# Patient Record
Sex: FEMALE | Race: WHITE | NOT HISPANIC OR LATINO | Employment: OTHER | URBAN - METROPOLITAN AREA
[De-identification: names, ages, dates, MRNs, and addresses within clinical notes are randomized per-mention and may not be internally consistent; named-entity substitution may affect disease eponyms.]

---

## 2018-05-08 ENCOUNTER — OFFICE VISIT (OUTPATIENT)
Dept: ENDOCRINOLOGY | Facility: CLINIC | Age: 73
End: 2018-05-08
Attending: INTERNAL MEDICINE
Payer: MEDICARE

## 2018-05-08 VITALS
BODY MASS INDEX: 27.29 KG/M2 | WEIGHT: 154 LBS | HEART RATE: 56 BPM | DIASTOLIC BLOOD PRESSURE: 60 MMHG | SYSTOLIC BLOOD PRESSURE: 130 MMHG | HEIGHT: 63 IN

## 2018-05-08 DIAGNOSIS — E89.0 POST-SURGICAL HYPOTHYROIDISM: ICD-10-CM

## 2018-05-08 DIAGNOSIS — E55.9 VITAMIN D DEFICIENCY: ICD-10-CM

## 2018-05-08 DIAGNOSIS — M85.80 OSTEOPENIA, UNSPECIFIED LOCATION: Primary | ICD-10-CM

## 2018-05-08 PROCEDURE — 99214 OFFICE O/P EST MOD 30 MIN: CPT | Performed by: INTERNAL MEDICINE

## 2018-05-08 RX ORDER — ESOMEPRAZOLE MAGNESIUM 40 MG/1
40 CAPSULE, DELAYED RELEASE ORAL
COMMUNITY
Start: 2016-11-02 | End: 2018-07-19

## 2018-05-08 RX ORDER — VITAMIN E 268 MG
CAPSULE ORAL
COMMUNITY
Start: 2016-11-02

## 2018-05-08 RX ORDER — ASPIRIN 81 MG/1
81 TABLET ORAL
COMMUNITY
Start: 2016-11-02 | End: 2021-05-11

## 2018-05-08 RX ORDER — CHOLECALCIFEROL (VITAMIN D3) 50 MCG
TABLET ORAL
COMMUNITY
Start: 2016-11-02

## 2018-05-08 RX ORDER — FEXOFENADINE HCL AND PSEUDOEPHEDRINE HCL 180; 240 MG/1; MG/1
TABLET, EXTENDED RELEASE ORAL
COMMUNITY
Start: 2016-11-02

## 2018-05-08 RX ORDER — ROSUVASTATIN CALCIUM 20 MG/1
10 TABLET, COATED ORAL
COMMUNITY
Start: 2016-11-02

## 2018-05-08 RX ORDER — LEVOTHYROXINE SODIUM 88 UG/1
TABLET ORAL
COMMUNITY
Start: 2017-05-09 | End: 2018-07-09 | Stop reason: SDUPTHER

## 2018-05-08 RX ORDER — LANOLIN ALCOHOL/MO/W.PET/CERES
500 CREAM (GRAM) TOPICAL
COMMUNITY
Start: 2016-11-02
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
## 2018-05-08 NOTE — PATIENT INSTRUCTIONS
1.  get your blood test done    2. Continue vitamin D3, 2000 IU daily    3. Add centrum women 50 plus at dinner or later    4. When you need next prescription will send generic synthroid and recheck your levels to make sure it is ok    5. Continue walking    6. Add yoga/pilates etc...    7. Follow up in one year, get your bone density with dr. Reese, I will be to see it when you come back and see me.
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
## 2018-06-08 ENCOUNTER — APPOINTMENT (OUTPATIENT)
Dept: LAB | Facility: HOSPITAL | Age: 73
End: 2018-06-08
Attending: INTERNAL MEDICINE
Payer: MEDICARE

## 2018-06-08 DIAGNOSIS — M85.80 OSTEOPENIA, UNSPECIFIED LOCATION: ICD-10-CM

## 2018-06-08 DIAGNOSIS — E89.0 POST-SURGICAL HYPOTHYROIDISM: ICD-10-CM

## 2018-06-08 DIAGNOSIS — E55.9 VITAMIN D DEFICIENCY: ICD-10-CM

## 2018-06-08 LAB
25(OH)D3 SERPL-MCNC: 34 NG/ML (ref 30–100)
ALBUMIN SERPL-MCNC: 4.2 G/DL (ref 3.4–5)
ALP SERPL-CCNC: 82 IU/L (ref 35–126)
ALT SERPL-CCNC: 24 IU/L (ref 11–54)
ANION GAP SERPL CALC-SCNC: 10 MEQ/L (ref 3–15)
AST SERPL-CCNC: 25 IU/L (ref 15–41)
BILIRUB SERPL-MCNC: 0.6 MG/DL (ref 0.3–1.2)
BUN SERPL-MCNC: 15 MG/DL (ref 8–20)
CALCIUM SERPL-MCNC: 9.5 MG/DL (ref 8.9–10.3)
CALCIUM SERPL-MCNC: 9.5 MG/DL (ref 8.9–10.3)
CHLORIDE SERPL-SCNC: 102 MMOL/L (ref 98–109)
CO2 SERPL-SCNC: 27 MMOL/L (ref 22–32)
CREAT SERPL-MCNC: 0.7 MG/DL (ref 0.6–1.1)
GFR SERPL CREATININE-BSD FRML MDRD: >60 ML/MIN/1.73M*2
GLUCOSE SERPL-MCNC: 138 MG/DL (ref 70–99)
PHOSPHATE SERPL-MCNC: 3.9 MG/DL (ref 2.4–4.7)
POTASSIUM SERPL-SCNC: 4 MMOL/L (ref 3.6–5.1)
PROT SERPL-MCNC: 6.1 G/DL (ref 6–8.2)
PTH-INTACT SERPL-MCNC: 55.1 PG/ML (ref 12–88)
SODIUM SERPL-SCNC: 139 MMOL/L (ref 136–144)
T4 FREE SERPL-MCNC: 0.78 NG/DL (ref 0.58–1.64)
TSH SERPL DL<=0.05 MIU/L-ACNC: 0.9 MIU/ML (ref 0.34–5.6)

## 2018-06-08 PROCEDURE — 84443 ASSAY THYROID STIM HORMONE: CPT

## 2018-06-08 PROCEDURE — 84100 ASSAY OF PHOSPHORUS: CPT

## 2018-06-08 PROCEDURE — 84439 ASSAY OF FREE THYROXINE: CPT

## 2018-06-08 PROCEDURE — 82306 VITAMIN D 25 HYDROXY: CPT

## 2018-06-08 PROCEDURE — 83970 ASSAY OF PARATHORMONE: CPT

## 2018-06-08 PROCEDURE — 36415 COLL VENOUS BLD VENIPUNCTURE: CPT

## 2018-06-08 PROCEDURE — 80053 COMPREHEN METABOLIC PANEL: CPT

## 2018-07-09 RX ORDER — LEVOTHYROXINE SODIUM 88 UG/1
TABLET ORAL
Qty: 90 TABLET | Refills: 0 | Status: SHIPPED | OUTPATIENT
Start: 2018-07-09 | End: 2018-07-11 | Stop reason: SDUPTHER

## 2018-07-11 RX ORDER — LEVOTHYROXINE SODIUM 88 UG/1
TABLET ORAL
Qty: 90 TABLET | Refills: 0 | Status: SHIPPED | OUTPATIENT
Start: 2018-07-11 | End: 2019-01-28 | Stop reason: SDUPTHER

## 2018-07-19 ENCOUNTER — HOSPITAL ENCOUNTER (EMERGENCY)
Facility: HOSPITAL | Age: 73
Discharge: HOME | End: 2018-07-19
Attending: EMERGENCY MEDICINE
Payer: MEDICARE

## 2018-07-19 ENCOUNTER — APPOINTMENT (EMERGENCY)
Dept: RADIOLOGY | Facility: HOSPITAL | Age: 73
End: 2018-07-19
Payer: MEDICARE

## 2018-07-19 VITALS
HEIGHT: 63 IN | DIASTOLIC BLOOD PRESSURE: 63 MMHG | RESPIRATION RATE: 18 BRPM | TEMPERATURE: 97.1 F | SYSTOLIC BLOOD PRESSURE: 137 MMHG | BODY MASS INDEX: 26.58 KG/M2 | HEART RATE: 59 BPM | OXYGEN SATURATION: 95 % | WEIGHT: 150 LBS

## 2018-07-19 DIAGNOSIS — Z86.69 HISTORY OF MIGRAINE: ICD-10-CM

## 2018-07-19 DIAGNOSIS — H53.9 VISUAL DISTURBANCE: ICD-10-CM

## 2018-07-19 DIAGNOSIS — R51.9 NONINTRACTABLE HEADACHE, UNSPECIFIED CHRONICITY PATTERN, UNSPECIFIED HEADACHE TYPE: Primary | ICD-10-CM

## 2018-07-19 PROCEDURE — 63700000 HC SELF-ADMINISTRABLE DRUG: Performed by: NURSE PRACTITIONER

## 2018-07-19 PROCEDURE — 70450 CT HEAD/BRAIN W/O DYE: CPT

## 2018-07-19 PROCEDURE — 99284 EMERGENCY DEPT VISIT MOD MDM: CPT | Mod: 25

## 2018-07-19 RX ORDER — ACETAMINOPHEN 325 MG/1
650 TABLET ORAL ONCE
Status: COMPLETED | OUTPATIENT
Start: 2018-07-19 | End: 2018-07-19

## 2018-07-19 RX ADMIN — ACETAMINOPHEN 650 MG: 325 TABLET, FILM COATED ORAL at 20:08

## 2018-07-19 ASSESSMENT — ENCOUNTER SYMPTOMS
CONSTIPATION: 0
SORE THROAT: 0
FATIGUE: 0
EYE REDNESS: 0
NAUSEA: 0
EYE PAIN: 0
DIZZINESS: 0
MYALGIAS: 1
VOMITING: 0
SPEECH DIFFICULTY: 0
FACIAL ASYMMETRY: 0
COUGH: 0
LIGHT-HEADEDNESS: 0
DIARRHEA: 0
BACK PAIN: 0
NECK PAIN: 0
NUMBNESS: 0
COLOR CHANGE: 0
SHORTNESS OF BREATH: 0
FEVER: 0
ABDOMINAL PAIN: 0
CONFUSION: 0
DIFFICULTY URINATING: 0
HEADACHES: 1
PHOTOPHOBIA: 1
WEAKNESS: 0

## 2018-07-20 NOTE — ED ATTESTATION NOTE
7/19/201810:21 PM  I have personally seen and examined the patient.  I reviewed and agree with the PA/NP/Resident's assessment and plan of care.         Dwayne Foster, DO  07/19/18 2222

## 2018-07-20 NOTE — DISCHARGE INSTRUCTIONS
As discussed please call your primary care doctor and ophthalmologist today to inform them of your ER visit and arrange a follow-up appointment within the next 2-3 days.  If you do not have one we have provided you with one. You do not have to see this provider in particular, just call the office and ask for the earliest available appointment.  Please return immediately for any worsening, new or concerning symptoms such as new/worsening pain, fevers, vomiting, worsening in vision, numbness, weakness, etc    Please take acetaminophen (Tylenol) as per package instructions.  Please do not exceed 3 g in 24 hours    Please take ibuprofen (i.e. Motrin or Advil) as per package instructions with food do not exceed 3200mg in 24 hours    No driving if you are having visual changes

## 2019-01-28 RX ORDER — LEVOTHYROXINE SODIUM 88 UG/1
TABLET ORAL
Qty: 90 TABLET | Refills: 0 | Status: SHIPPED | OUTPATIENT
Start: 2019-01-28 | End: 2019-04-25 | Stop reason: SDUPTHER

## 2019-04-09 PROCEDURE — 88305 TISSUE EXAM BY PATHOLOGIST: CPT | Performed by: DERMATOLOGY

## 2019-04-10 ENCOUNTER — LAB REQUISITION (OUTPATIENT)
Dept: LAB | Facility: HOSPITAL | Age: 74
End: 2019-04-10
Payer: MEDICARE

## 2019-04-10 DIAGNOSIS — D23.5 OTHER BENIGN NEOPLASM OF SKIN OF TRUNK: ICD-10-CM

## 2019-04-11 LAB
CASE RPRT: NORMAL
CLINICAL INFO: NORMAL
PATH REPORT.FINAL DX SPEC: NORMAL
PATH REPORT.GROSS SPEC: NORMAL
PATH REPORT.MICROSCOPIC SPEC OTHER STN: NORMAL

## 2019-04-25 RX ORDER — LEVOTHYROXINE SODIUM 88 UG/1
TABLET ORAL
Qty: 90 TABLET | Refills: 0 | Status: SHIPPED | OUTPATIENT
Start: 2019-04-25 | End: 2019-05-07 | Stop reason: SDUPTHER

## 2019-05-07 ENCOUNTER — OFFICE VISIT (OUTPATIENT)
Dept: ENDOCRINOLOGY | Facility: CLINIC | Age: 74
End: 2019-05-07
Payer: MEDICARE

## 2019-05-07 VITALS
BODY MASS INDEX: 26.86 KG/M2 | WEIGHT: 151.6 LBS | HEART RATE: 61 BPM | DIASTOLIC BLOOD PRESSURE: 64 MMHG | HEIGHT: 63 IN | SYSTOLIC BLOOD PRESSURE: 120 MMHG

## 2019-05-07 DIAGNOSIS — E89.0 POST-SURGICAL HYPOTHYROIDISM: ICD-10-CM

## 2019-05-07 DIAGNOSIS — E55.9 VITAMIN D DEFICIENCY: ICD-10-CM

## 2019-05-07 DIAGNOSIS — M85.80 OSTEOPENIA, UNSPECIFIED LOCATION: Primary | ICD-10-CM

## 2019-05-07 PROCEDURE — 99214 OFFICE O/P EST MOD 30 MIN: CPT | Performed by: INTERNAL MEDICINE

## 2019-05-07 RX ORDER — LEVOTHYROXINE SODIUM 88 UG/1
88 TABLET ORAL
Qty: 90 TABLET | Refills: 3 | Status: SHIPPED | OUTPATIENT
Start: 2019-05-07 | End: 2020-01-13 | Stop reason: SDUPTHER

## 2019-05-07 NOTE — PATIENT INSTRUCTIONS
1. Synthroid 88 mcg daily    2. Thyroid and vitamin D level when you get blood work done    3. Bone density is stable    4. Follow up with me in one year

## 2019-05-07 NOTE — LETTER
May 7, 2019     Tiera Egan MD  [address]  [place] [address]    Patient: Agnieszka Burton   YOB: 1945   Date of Visit: 5/7/2019       Dear Dr. Egan:    Thank you for referring Agnieszka Burton to me for evaluation. Below are my notes for this consultation.    If you have questions, please do not hesitate to call me. I look forward to following your patient along with you.         Sincerely,        Aicha Cota MD        CC: No Recipients  Aicha Cota MD  5/7/2019 12:22 PM  Signed       ENDOCRINOLOGY NOTE       Subjective    HPI:  Agnieszka Burton is a 73 y.o. female who presents for follow up for thyroid disease and osteopenia    Last visit: 5/9/18, she continues on Synthroid 88 mcg daily.     In 2017,She took Atelvia for about a month, but she developed severe generalized joint pain, including in her knees, shoulders, elbows, and wrists, and those symptoms improved about a week after she stopped the Atelvia.      She also started taking Citracal calcium supplement, but developed significant abdominal cramps.  She also has lactose intolerance, so it is hard for her to get enough calcium in her diet.    She is eating green leafy vegetables, she uses lactaid in her coffee, 4 to 8 oz per day. She added the centrum for women    She walks the dog, she is gardening. Will be swimming soon.         Medications:  Synthroid 88 mcg daily, vitamin D3, 2000 units daily, Nexium 40 mg daily, Crestor 10 mg daily, aspirin 81 mg daily, Allegra, vitamin C and vitamin E, centrum for women 50 +    Social History:  She stopped smoking completely in February of 2017.  She is .  She has one son and one daughter.  She stopped working January of 2018. 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         Medications:    Current Outpatient Prescriptions:   •  ascorbic acid, vitamin C, (VITAMIN C) 500 mg CR  capsule, 500 mg., Disp: , Rfl:   •  aspirin (ASPIR-81) 81 mg enteric coated tablet, 81 mg., Disp: , Rfl:   •  cholecalciferol, vitamin D3, (VITAMIN D3) 2,000 unit tablet, take 1 by oral route  every day, Disp: , Rfl:   •  fexofenadine-pseudoephedrine (ALLEGRA-D 24 HOUR) 180-240 mg per 24 hr tablet, take 1 tablet by oral route  every day on an empty stomach with a glass of water, Disp: , Rfl:   •  rosuvastatin (CRESTOR) 10 mg tablet, 10 mg., Disp: , Rfl:   •  SYNTHROID 88 mcg tablet, Take 1 tablet (88 mcg total) by mouth once daily., Disp: 90 tablet, Rfl: 3  •  vitamin E 400 unit capsule, take 2 pills daily, Disp: , Rfl:      Allergies:  Iodinated contrast- oral and iv dye; Latex; Penicillin; and Sulfa (sulfonamide antibiotics)     Medical History:  Past Medical History:   Diagnosis Date   • Acid reflux    • Disease of thyroid gland    • Lipid disorder        Surgical History:   Past Surgical History:   Procedure Laterality Date   • APPENDECTOMY     • HYSTERECTOMY     • NEPHROPEXY     • THYROIDECTOMY         Family History:  Family History   Problem Relation Age of Onset   • Coronary artery disease Mother    • Osteoporosis Mother    • COPD Father    • Heart attack Father        Social history:  Social History     Social History   • Marital status: Single     Spouse name: N/A   • Number of children: N/A   • Years of education: N/A     Occupational History   • Not on file.     Social History Main Topics   • Smoking status: Former Smoker     Quit date: 1977   • Smokeless tobacco: Never Used   • Alcohol use Yes      Comment: social    • Drug use: No   • Sexual activity: Not on file     Other Topics Concern   • Not on file     Social History Narrative   • No narrative on file       Review of Systems  All other systems reviewed and negative except as noted in HPI    Objective   Vitals:    05/07/19 1117   BP: 120/64   BP Location: Right upper arm   Patient Position: Sitting   Pulse: 61   Weight: 68.8 kg (151 lb 9.6 oz)  "  Height: 1.6 m (5' 3\")     Body mass index is 26.85 kg/m².    Physical Exam   Constitutional: She appears well-developed.   HENT:   Head: Normocephalic and atraumatic.   Eyes: Pupils are equal, round, and reactive to light. Conjunctivae and EOM are normal.   Neck: No tracheal deviation present. No thyromegaly present.   Neck surgical scars well-healed   Cardiovascular: Normal rate, regular rhythm and normal heart sounds.    Pulmonary/Chest: Effort normal.   Musculoskeletal: She exhibits no edema.   Lymphadenopathy:     She has no cervical adenopathy.   Neurological: She displays normal reflexes.   Skin: No rash noted.   Psychiatric: She has a normal mood and affect.       Lab Results   Component Value Date    TSH 0.90 06/08/2018    TSH 1.00 05/03/2017    FREET4 0.78 06/08/2018    ALT 24 06/08/2018        Lab Results   Component Value Date    CREATININE 0.7 06/08/2018    K 4.0 06/08/2018     Lab Results   Component Value Date    TSH 0.90 06/08/2018     Laboratory Data: On 5/03/17, her calcium is 9.7, her creatinine is 0.6, her intact PTH is 57.7, her TSH is 1.0, her free T4 is normal, her 25 vitamin D is 33.    DEXA 10/4/18 (Compared to 8/22/16)  T-score  L spine -0.9  Total hip -1.7  Femoral neck -2  Stable at the hip  Increased 2/7 % at the spine      Assessment/Plan    Osteopenia    She stopped smoking in 2017.  She did not tolerate Atelvia because of significant joint pain.  She did not tolerate calcium supplements because of abdominal cramps.    I reviewed the results of her bone density.  Continue supplements as discussed above.  She will continue regular exercise especially muscle strengthening.  She can consider physical therapy, she will discuss that with her primary doctor.    Surgical Hypothyroidism    She had total thyroidectomy for a large symptomatic goiter in 2009 by Dr. Frost.   The final surgical pathology was benign.  She had temporary left vocal cord paralysis after the surgery, but she recovered.  " She is euthyroid clinically and biochemically.  She will continue Synthroid 88 mcg daily.    Acid Reflux    Her symptoms are controlled on treatment with Nexium.  It is not interfering with her thyroid levels.        Total encounter time 25 minutes,  greater than 50% spent counseling/coordination of care.  I reviewed the patient's labs/imaging/medications/allergies/problem list.   Patient Instructions   1. Synthroid 88 mcg daily    2. Thyroid and vitamin D level when you get blood work done    3. Bone density is stable    4. Follow up with me in one year      Aicha Cota MD  5/7/2019

## 2019-05-07 NOTE — PROGRESS NOTES
ENDOCRINOLOGY NOTE       Subjective    HPI:  Agnieszka Burton is a 73 y.o. female who presents for follow up for thyroid disease and osteopenia    Last visit: 5/9/18, she continues on Synthroid 88 mcg daily.     In 2017,She took Atelvia for about a month, but she developed severe generalized joint pain, including in her knees, shoulders, elbows, and wrists, and those symptoms improved about a week after she stopped the Atelvia.      She also started taking Citracal calcium supplement, but developed significant abdominal cramps.  She also has lactose intolerance, so it is hard for her to get enough calcium in her diet.    She is eating green leafy vegetables, she uses lactaid in her coffee, 4 to 8 oz per day. She added the centrum for women    She walks the dog, she is gardening. Will be swimming soon.         Medications:  Synthroid 88 mcg daily, vitamin D3, 2000 units daily, Nexium 40 mg daily, Crestor 10 mg daily, aspirin 81 mg daily, Allegra, vitamin C and vitamin E, centrum for women 50 +    Social History:  She stopped smoking completely in February of 2017.  She is .  She has one son and one daughter.  She stopped working January of 2018. 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.  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.         Medications:    Current Outpatient Prescriptions:   •  ascorbic acid, vitamin C, (VITAMIN C) 500 mg CR capsule, 500 mg., Disp: , Rfl:   •  aspirin (ASPIR-81) 81 mg enteric coated tablet, 81 mg., Disp: , Rfl:   •  cholecalciferol, vitamin D3, (VITAMIN D3) 2,000 unit tablet, take 1 by oral route  every day, Disp: , Rfl:   •  fexofenadine-pseudoephedrine (ALLEGRA-D 24 HOUR) 180-240 mg per 24 hr tablet, take 1 tablet by oral route  every day on an empty stomach with a glass of water, Disp: , Rfl:   •  rosuvastatin (CRESTOR) 10 mg tablet, 10 mg., Disp: , Rfl:   •  SYNTHROID 88 mcg tablet, Take 1 tablet (88 mcg total) by mouth once daily.,  "Disp: 90 tablet, Rfl: 3  •  vitamin E 400 unit capsule, take 2 pills daily, Disp: , Rfl:      Allergies:  Iodinated contrast- oral and iv dye; Latex; Penicillin; and Sulfa (sulfonamide antibiotics)     Medical History:  Past Medical History:   Diagnosis Date   • Acid reflux    • Disease of thyroid gland    • Lipid disorder        Surgical History:   Past Surgical History:   Procedure Laterality Date   • APPENDECTOMY     • HYSTERECTOMY     • NEPHROPEXY     • THYROIDECTOMY         Family History:  Family History   Problem Relation Age of Onset   • Coronary artery disease Mother    • Osteoporosis Mother    • COPD Father    • Heart attack Father        Social history:  Social History     Social History   • Marital status: Single     Spouse name: N/A   • Number of children: N/A   • Years of education: N/A     Occupational History   • Not on file.     Social History Main Topics   • Smoking status: Former Smoker     Quit date: 1977   • Smokeless tobacco: Never Used   • Alcohol use Yes      Comment: social    • Drug use: No   • Sexual activity: Not on file     Other Topics Concern   • Not on file     Social History Narrative   • No narrative on file       Review of Systems  All other systems reviewed and negative except as noted in HPI    Objective   Vitals:    05/07/19 1117   BP: 120/64   BP Location: Right upper arm   Patient Position: Sitting   Pulse: 61   Weight: 68.8 kg (151 lb 9.6 oz)   Height: 1.6 m (5' 3\")     Body mass index is 26.85 kg/m².    Physical Exam   Constitutional: She appears well-developed.   HENT:   Head: Normocephalic and atraumatic.   Eyes: Pupils are equal, round, and reactive to light. Conjunctivae and EOM are normal.   Neck: No tracheal deviation present. No thyromegaly present.   Neck surgical scars well-healed   Cardiovascular: Normal rate, regular rhythm and normal heart sounds.    Pulmonary/Chest: Effort normal.   Musculoskeletal: She exhibits no edema.   Lymphadenopathy:     She has no " cervical adenopathy.   Neurological: She displays normal reflexes.   Skin: No rash noted.   Psychiatric: She has a normal mood and affect.       Lab Results   Component Value Date    TSH 0.90 06/08/2018    TSH 1.00 05/03/2017    FREET4 0.78 06/08/2018    ALT 24 06/08/2018        Lab Results   Component Value Date    CREATININE 0.7 06/08/2018    K 4.0 06/08/2018     Lab Results   Component Value Date    TSH 0.90 06/08/2018     Laboratory Data: On 5/03/17, her calcium is 9.7, her creatinine is 0.6, her intact PTH is 57.7, her TSH is 1.0, her free T4 is normal, her 25 vitamin D is 33.    DEXA 10/4/18 (Compared to 8/22/16)  T-score  L spine -0.9  Total hip -1.7  Femoral neck -2  Stable at the hip  Increased 2/7 % at the spine      Assessment/Plan    Osteopenia    She stopped smoking in 2017.  She did not tolerate Atelvia because of significant joint pain.  She did not tolerate calcium supplements because of abdominal cramps.    I reviewed the results of her bone density.  Continue supplements as discussed above.  She will continue regular exercise especially muscle strengthening.  She can consider physical therapy, she will discuss that with her primary doctor.    Surgical Hypothyroidism    She had total thyroidectomy for a large symptomatic goiter in 2009 by Dr. Frost.   The final surgical pathology was benign.  She had temporary left vocal cord paralysis after the surgery, but she recovered.  She is euthyroid clinically and biochemically.  She will continue Synthroid 88 mcg daily.    Acid Reflux    Her symptoms are controlled on treatment with Nexium.  It is not interfering with her thyroid levels.        Total encounter time 25 minutes,  greater than 50% spent counseling/coordination of care.  I reviewed the patient's labs/imaging/medications/allergies/problem list.   Patient Instructions   1. Synthroid 88 mcg daily    2. Thyroid and vitamin D level when you get blood work done    3. Bone density is stable    4.  Follow up with me in one year      Aicha Cota MD  5/7/2019

## 2019-09-10 ENCOUNTER — TRANSCRIBE ORDERS (OUTPATIENT)
Dept: SCHEDULING | Age: 74
End: 2019-09-10

## 2019-09-10 DIAGNOSIS — M54.17 RADICULOPATHY OF LUMBOSACRAL REGION: Primary | ICD-10-CM

## 2019-09-11 ENCOUNTER — HOSPITAL ENCOUNTER (OUTPATIENT)
Dept: RADIOLOGY | Facility: HOSPITAL | Age: 74
Discharge: HOME | End: 2019-09-11
Attending: NURSE PRACTITIONER
Payer: MEDICARE

## 2019-09-11 DIAGNOSIS — M54.17 RADICULOPATHY OF LUMBOSACRAL REGION: ICD-10-CM

## 2020-01-13 ENCOUNTER — TELEPHONE (OUTPATIENT)
Dept: ENDOCRINOLOGY | Facility: CLINIC | Age: 75
End: 2020-01-13

## 2020-01-13 RX ORDER — LEVOTHYROXINE SODIUM 88 UG/1
88 TABLET ORAL
Qty: 90 TABLET | Refills: 3 | Status: SHIPPED | OUTPATIENT
Start: 2020-01-13 | End: 2020-12-10 | Stop reason: SDUPTHER

## 2020-01-13 NOTE — TELEPHONE ENCOUNTER
Agnieszka Burton called requesting Synthroid dispensed to [facility]. Rx dispensed.

## 2020-05-11 ENCOUNTER — TELEMEDICINE (OUTPATIENT)
Dept: ENDOCRINOLOGY | Facility: CLINIC | Age: 75
End: 2020-05-11
Payer: MEDICARE

## 2020-05-11 DIAGNOSIS — E55.9 VITAMIN D DEFICIENCY: ICD-10-CM

## 2020-05-11 DIAGNOSIS — Z13.820 SCREENING FOR OSTEOPOROSIS: ICD-10-CM

## 2020-05-11 DIAGNOSIS — E89.0 POST-SURGICAL HYPOTHYROIDISM: Primary | ICD-10-CM

## 2020-05-11 DIAGNOSIS — M85.89 OTHER SPECIFIED DISORDERS OF BONE DENSITY AND STRUCTURE, MULTIPLE SITES: ICD-10-CM

## 2020-05-11 DIAGNOSIS — M85.80 OSTEOPENIA, UNSPECIFIED LOCATION: ICD-10-CM

## 2020-05-11 PROCEDURE — 99214 OFFICE O/P EST MOD 30 MIN: CPT | Mod: 95 | Performed by: INTERNAL MEDICINE

## 2020-05-11 NOTE — PATIENT INSTRUCTIONS
1. Continue Synthroid 88 mcg daily at wake up    2. Move the centrum to the afternoon    3. Continue vitamin D3, 2000 IU daily    4. Calcium in your diet: lactaid, green leafy vegetables    5. Continue walking    6. Get your blood test done when you are able to after this visit.     7. Get your bone density done (same place/[place] [facility] [place]), a couple of weeks before you see me in follow up.

## 2020-05-11 NOTE — PROGRESS NOTES
Verification of Patient Location:  The patient affirms they are currently located in the following state: [place]    Request for Consent:   Video Encounter   Olena, my name is Aicha Cota MD.  Before we proceed, can you please verify your identification by telling me your full name and date of birth?  Can you tell me who is in the room with you?    You and I are about to have a telemedicine check-in or visit because you have requested it.  This is a live video-conference.  I am a real person, speaking to you in real time.  There is no one else with me on the video-conference.  However, when we use ([employer], [employer], etc) it is important for you to know that the video-conference may not be secure or private.  I am not recording this conversation and I am asking you not to record it.  This telemedicine visit will be billed to your health insurance or you, if you are self-insured.  You understand you will be responsible for any copayments or coinsurances that apply to your telemedicine visit.  Before starting our telemedicine visit, I am required to get your consent for this virtual check-in or visit by telemedicine. Do you consent?    Patient Response to Request for Consent:  Yes      Visit Documentation:  Subjective     Patient ID: Agnieszka Burton is a 74 y.o. female.  1945      HPI   She continues Synthroid (brand) 88 mcg daily, takes it on empty stomach in the morning, eats 1 to 2 hrs later.   Overall she is feeling well.   She has achiness in her knees. She is active.   She has occasional diarrhea. She had colonoscopy February 2020  No heart palpitations.   Her weight is stable at 150 lbs.     Supplements:  - vitamin D3 2000 IU daily  - centrum for women 50 plus (has been taking it in the morning)  - vitamin C  - vitamin E    Diet:  - lactaid with her coffee (2 cups of coffee per day)  - yogurt on and off  - cheese daily    Exercise:   - walks the dog, 20 minutes, twice a day  - gardening  -  swimming last summer    Social History:  She stopped smoking completely in February of 2017.  She is .  She has one son and one daughter.  She stopped working January of 2018. 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     DEXA 10/4/18 (Compared to 8/22/16)  T-score  L spine -0.9  Total hip -1.7  Femoral neck -2  Stable at the hip  Increased 2/7 % at the spine    Assessment/Plan    Surgical Hypothyroidism    She had total thyroidectomy for a large symptomatic goiter in 2009 by Dr. Frost.   The final surgical pathology was benign.  She had temporary left vocal cord paralysis after the surgery, but she recovered.  She is euthyroid clinically.  She will continue Synthroid 88 mcg daily.  She will move the multivitamin to the afternoon.   Check thyroid levels as she is able to get to the lab    Osteopenia    She stopped smoking in 2017.  She did not tolerate Atelvia because of significant joint pain.  She did not tolerate calcium supplements because of abdominal cramps.     I reviewed the results of her bone density.  Continue supplements as discussed above.  She will continue regular exercise especially muscle strengthening.        Acid Reflux    Her symptoms are controlled on treatment with Nexium.  It is not interfering with her thyroid levels.      Diagnoses and all orders for this visit:    Post-surgical hypothyroidism  (Primary)  -     Comprehensive metabolic panel; Future  -     TSH 3rd Generation; Future  -     T4, free; Future  -     Phosphorus; Future  -     Vitamin D 25 hydroxy; Future    Osteopenia, unspecified location  -     Comprehensive metabolic panel; Future  -     TSH 3rd Generation; Future  -     T4, free; Future  -     Phosphorus; Future  -     Vitamin D 25 hydroxy; Future    Vitamin D deficiency  -     Vitamin D 25 hydroxy; Future    Screening for osteoporosis  -     DEXA BONE DENSITY; Future    Other specified disorders of bone density and structure, multiple sites   -     DEXA BONE DENSITY; Future        Time Spent in Medical Discussion During This Encounter:      21 minutes

## 2020-09-21 LAB
25(OH)D3+25(OH)D2 SERPL-MCNC: 32.8 NG/ML (ref 30–100)
ALBUMIN SERPL-MCNC: 4.3 G/DL (ref 3.7–4.7)
ALBUMIN/GLOB SERPL: 2.5 {RATIO} (ref 1.2–2.2)
ALP SERPL-CCNC: 76 IU/L (ref 39–117)
ALT SERPL-CCNC: 18 IU/L (ref 0–32)
AST SERPL-CCNC: 19 IU/L (ref 0–40)
BILIRUB SERPL-MCNC: 0.4 MG/DL (ref 0–1.2)
BUN SERPL-MCNC: 13 MG/DL (ref 8–27)
BUN/CREAT SERPL: 19 (ref 12–28)
CALCIUM SERPL-MCNC: 9.1 MG/DL (ref 8.7–10.3)
CHLORIDE SERPL-SCNC: 107 MMOL/L (ref 96–106)
CO2 SERPL-SCNC: 24 MMOL/L (ref 20–29)
CREAT SERPL-MCNC: 0.67 MG/DL (ref 0.57–1)
GLOBULIN SER CALC-MCNC: 1.7 G/DL (ref 1.5–4.5)
GLUCOSE SERPL-MCNC: 108 MG/DL (ref 65–99)
LAB CORP EGFR IF AFRICN AM: 101 ML/MIN/1.73
LAB CORP EGFR IF NONAFRICN AM: 87 ML/MIN/1.73
POTASSIUM SERPL-SCNC: 4.1 MMOL/L (ref 3.5–5.2)
PROT SERPL-MCNC: 6 G/DL (ref 6–8.5)
SODIUM SERPL-SCNC: 142 MMOL/L (ref 134–144)

## 2020-09-22 LAB
PHOSPHATE SERPL-MCNC: 3.9 MG/DL (ref 3–4.3)
T4 FREE SERPL-MCNC: 1.12 NG/DL (ref 0.82–1.77)
TSH SERPL DL<=0.005 MIU/L-ACNC: 0.99 UIU/ML (ref 0.45–4.5)

## 2020-10-21 NOTE — PROGRESS NOTES
[facility]  Dr. Laurence EDDY Sizer  [address] [place] [address]  Phone: [phone]  Fax: [phone]      Patient ID: Agnieszka Burton                              : 1945    Visit Date: 10/23/2020  Referring Provider: No ref. provider found   PCP: Tiera Egan MD  GYN: No care team member to display    Chief Complaint: Abnormal Breast Imaging      Agnieszka Burton is a 74 y.o.  female who presents for a second opinion regarding her new Right sided breast cancer that was recently diagnosed at [facility] on 10/15/2020. Her complaint are mostly on the left breast. During the patient's mammogram she had large amounts of brown nipple discharge.     Review of Systems   Constitutional: Negative for chills and fever.   HENT: Negative for congestion and sore throat.    Respiratory: Negative for chest tightness, shortness of breath and wheezing.    Cardiovascular: Negative for chest pain and leg swelling.   Gastrointestinal: Negative for abdominal distention and constipation.   Musculoskeletal: Negative for back pain and neck pain.   Skin: Negative for wound.   Neurological: Negative for headaches.   Hematological: Negative for adenopathy. Does not bruise/bleed easily.   Psychiatric/Behavioral: The patient is not nervous/anxious.           Breast Health:  Age at menarche: 15  Age at first live birth: 30   .   Age at menopause: 46  Breastfeeding? no  HRT: no  Fertility Tx: no  OCP: no     Allergies: Iodinated contrast media, Latex, Penicillin, and Sulfa (sulfonamide antibiotics)    Current Medications: has a current medication list which includes the following prescription(s): ascorbic acid (vitamin c), aspirin, cholecalciferol (vitamin d3), fexofenadine-pseudoephedrine, rosuvastatin, synthroid, and vitamin e.    Past Medical History:  has a past medical history of Acid reflux, Disease of thyroid gland, and Lipid disorder.    Past Surgical History:  has a past surgical history that  "includes Appendectomy; Hysterectomy; Thyroidectomy; and nephropexy.    Social History:  reports that she quit smoking about 43 years ago. She has never used smokeless tobacco. She reports current alcohol use. She reports that she does not use drugs.    Family History: family history includes COPD in her biological father; Coronary artery disease in her biological mother; Heart attack in her biological father; Osteoporosis in her biological mother.        Physical Exam:    Vitals:   Visit Vitals  /70 (BP Location: Right upper arm, Patient Position: Sitting)   Pulse (!) 48   Temp 36.3 °C (97.4 °F) (Temporal)   Ht 1.6 m (5' 3\")   Wt 65.8 kg (145 lb)   SpO2 96%   BMI 25.69 kg/m²     Body mass index is 25.69 kg/m².    Physical Exam  Vitals signs reviewed.   Constitutional:       Appearance: She is well-developed.   HENT:      Head: Normocephalic.   Pulmonary:      Effort: Pulmonary effort is normal.      Breath sounds: Normal breath sounds.       Physical Examination performed in the supine and sitting position  BREAST SIZE:moderate  SYMMETRY yes       SKIN - Skin color, texture, turgor normal. No rashes or lesions Skin is without tethering, dimpling, retraction or increased vascularity  AREOLA - normal    NIPPLES -  normal without retraction and without discharge  LYMPH NODES: infra, supra, cervical, parasternal and axillary nodes normal bilaterally  BREAST TISSUE: Right breast normal without discrete lesions. Left Breast  normal without discrete lesion, but brown nipple discharge is present in left nipple. Nipple smear taken           Breast Imaging: I have personally reviewed the reports and images as follows.  Bilateral diagnostic mammogram performed on October 13, 2020 at outside institution-at 10:00 10 cm from the nipple there is a 7 mm mass with irregular margins    Right breast ultrasound performed in the same day demonstrates a hypoechoic mass with irregular margins measuring 7 mm.  The axilla was imaged " and demonstrated no enlarged lymph nodes      Breast Cancer Consult Note    Chief Complaint:  Agnieszka Burton is a 74 y.o. 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  +100%, progesterone +100% and HER-2/terrence +1      Assessment & Plan:  Agnieszka Burton is a 74 y.o. female with 7 mm lesion in the right breast at the 10:00 position identified by mammo and biopsied under us guidance on 10/15/20  Lesion is identified with  ???  shaped clip  (Outside films)       See below for details of our discussion of surgical risks.    CURRENT PLAN:    MRI   Cbc, bmp, ekg, covid  Nipple smear   Right seed localized lumpectomy and SLNB     DISCUSSION OF OPERATIVE RISKS  We discussed the different surgical treatment options for her cancer, either breast conservation treatment with adjuvant radiation therapy versus mastectomy. She is a candidate for breast conservation therapy based on the size of the tumor on mammography and ultrasound and the size of her breasts. I did inform the patient  that the simple mastectomy and lumpectomy with radiation therapy are essentially equivalent regarding overall survival. I discussed the general risks and benefits of lumpectomy including bleeding, infection, pain, numbness of the skin, necrosis of the skin, shrinkage and deformity of the breast, hematoma and seroma formation and the possibility of positive margins after the lumpectomy requiring re-excision at a later date, as well as recurrence rates. I discussed the general risks and benefits of a simple mastectomy including bleeding, infection, pain, numbness of the skin, damage to the skin and skin necrosis, hematoma and seroma formation and the possibility of positive margins after mastectomy that might necessitate adjuvant radiation treatment. I informed the patient that the  recurrence rate after mastectomy is not zero and estimated at 5%. Lumpectomy with Radiation would be approx 9-12 % and Lumpectomy alone would leave her with a 42% recurrence rate.  Overall survival for any of the three above choices are the same.  I also informed Agnieszka Burton that she can see a plastic surgeon to discuss options for reconstruction if she would like to proceed with the mastectomy.  I did introduce the various options available for reconstruction. I explained The procedure and recovery times for both mastectomy and lumpectomy.    Given Agnieszka Burton clinically negative lymph node we reviewed her options to manage axilla.  I discussed the process of the SLN biopsy procedure, including the need to have a radioactive isotope injected the morning of the operation, as well as the injection of lymphazurin. Both of these would be used to help identify the sentinel node. I discussed the technique of injection of the dye immediately pre-operatively and the fact that using both the dye and the radioisotope increases the chances of successful identification of the sentinel node. I then discussed the risks of the procedure itself including hematoma and seroma formation, lymphatic leakage and lymphocoele formation, skin infection, permanent numbness of the skin around the incision and pain. I also discussed the fact that the final pathology report from the lymph node would determine the possibility that the lymph node would still contain metastatic disease or micro-metastatic disease in which case a full axillary node dissection would be required.  I then explained to the patient what an axillary dissection would entail. I discussed the procedure, the approximate amount of lymph nodes and the risks of of these procedure which include,  further pain, numbness, nerve damage to the motor nerves causing winging of the scapula or muscle weakness, and lymphedema of the arm. I estimated her risk of lymphedema to  20% with an axillary dissection, while its only 7% with a SLNB but at our institution its 1.4%.      I then went on to introduce the ideas of Radiation, chemotherapy and hormone therapy.  I did review the general side effects of radation and the time line. Next, I discussed chemotherapy and the reason for requiring this therapy on top of surgery and radiation. I introduced the idea of neoadjuvant chemotherapy which is required in certain circumstances. This would be determined as we receive more information regarding her tumor biology. I explained to the patient  that chemotherapy is targeting the cells that have already escaped the breast and lymph nodes. Finally I introduced in brief the idea of targeted anti-estrogen treatment should she turn out to be sensitive to estrogen as well at the support groups available to her.     I will get an MRI as our next step and make sure she is in contact with a nurse navigator. The patient would like to talk to other patients who have undergone the various treatments.     All of her questions were answered.   A total of 60 minutes was spent in counseling the patient about the diagnosis and treatment options.    Visit Diagnosis:  Nipple discharge  (primary encounter diagnosis)  Plan: Cytology, Other    Invasive ductal carcinoma of right breast (CMS/HCC)  Plan: MRI BREAST BILATERAL WITH AND WITHOUT CONTRAST          Laurence Jara DO   10/23/2020   9:13 PM

## 2020-10-22 ENCOUNTER — CONSULT (OUTPATIENT)
Dept: SURGERY | Facility: CLINIC | Age: 75
End: 2020-10-22
Payer: MEDICARE

## 2020-10-22 ENCOUNTER — TELEPHONE (OUTPATIENT)
Dept: SURGERY | Facility: CLINIC | Age: 75
End: 2020-10-22

## 2020-10-22 VITALS
DIASTOLIC BLOOD PRESSURE: 70 MMHG | HEIGHT: 63 IN | WEIGHT: 145 LBS | BODY MASS INDEX: 25.69 KG/M2 | SYSTOLIC BLOOD PRESSURE: 140 MMHG | HEART RATE: 48 BPM | OXYGEN SATURATION: 96 % | TEMPERATURE: 97.4 F

## 2020-10-22 DIAGNOSIS — N64.52 NIPPLE DISCHARGE: Primary | ICD-10-CM

## 2020-10-22 DIAGNOSIS — C50.911 INVASIVE DUCTAL CARCINOMA OF RIGHT BREAST (CMS/HCC): ICD-10-CM

## 2020-10-22 PROCEDURE — 88160 CYTOPATH SMEAR OTHER SOURCE: CPT | Performed by: SURGERY

## 2020-10-22 PROCEDURE — 99205 OFFICE O/P NEW HI 60 MIN: CPT | Performed by: SURGERY

## 2020-10-22 NOTE — TELEPHONE ENCOUNTER
1:05 pm - I took Dominic discs and reports downstairs to BM file room to be uploaded into PACS.     RETURNED DISC & REPORT BACK TO PATIENT

## 2020-10-23 LAB
CASE RPRT: NORMAL
PATH REPORT.FINAL DX SPEC: NORMAL
PATH REPORT.GROSS SPEC: NORMAL

## 2020-10-23 ASSESSMENT — ENCOUNTER SYMPTOMS
WOUND: 0
HEADACHES: 0
ADENOPATHY: 0
FEVER: 0
BACK PAIN: 0
SHORTNESS OF BREATH: 0
CONSTIPATION: 0
BRUISES/BLEEDS EASILY: 0
SORE THROAT: 0
CHILLS: 0
WHEEZING: 0
ABDOMINAL DISTENTION: 0
NECK PAIN: 0
NERVOUS/ANXIOUS: 0
CHEST TIGHTNESS: 0

## 2020-10-29 ENCOUNTER — TRANSCRIBE ORDERS (OUTPATIENT)
Dept: RADIOLOGY | Age: 75
End: 2020-10-29

## 2020-10-29 DIAGNOSIS — C50.911 MALIGNANT NEOPLASM OF UNSPECIFIED SITE OF RIGHT FEMALE BREAST (CMS/HCC): Primary | ICD-10-CM

## 2020-12-01 ENCOUNTER — TELEPHONE (OUTPATIENT)
Dept: ENDOCRINOLOGY | Facility: CLINIC | Age: 75
End: 2020-12-01

## 2020-12-02 NOTE — TELEPHONE ENCOUNTER
Call patient: I received a copy of her bone density ordered by her GYN.   Bone density showed worsening at the hip. Set up follow up appointment to discuss treatment plan.

## 2020-12-02 NOTE — TELEPHONE ENCOUNTER
Spoke to pt verbalized understanding, also wants you to be aware that she was also dx with breast cancer and will need radiation, so she will discuss at appointment with you.

## 2020-12-10 ENCOUNTER — TELEMEDICINE (OUTPATIENT)
Dept: ENDOCRINOLOGY | Facility: CLINIC | Age: 75
End: 2020-12-10
Payer: MEDICARE

## 2020-12-10 DIAGNOSIS — E55.9 VITAMIN D DEFICIENCY: ICD-10-CM

## 2020-12-10 DIAGNOSIS — M85.80 OSTEOPENIA, UNSPECIFIED LOCATION: ICD-10-CM

## 2020-12-10 DIAGNOSIS — E89.0 POST-SURGICAL HYPOTHYROIDISM: Primary | ICD-10-CM

## 2020-12-10 PROCEDURE — 99214 OFFICE O/P EST MOD 30 MIN: CPT | Mod: 95 | Performed by: INTERNAL MEDICINE

## 2020-12-10 RX ORDER — LEVOTHYROXINE SODIUM 88 UG/1
88 TABLET ORAL
Qty: 90 TABLET | Refills: 3 | Status: SHIPPED | OUTPATIENT
Start: 2020-12-10 | End: 2021-08-03

## 2020-12-10 NOTE — PATIENT INSTRUCTIONS
-Continue Synthroid 88 MCG daily    -You may need a lower dose as you continue to lose weight.  It is important to get your blood work done and 3 months.    -Send a portal message about your lab results if you do not hear after 1 week.    -Continue working on getting enough calcium in your diet.    -Take vitamin D3 2000units every single day.  If you miss a dose double up the next day.      -Regular exercise    -Plan treatment for the osteoporosis with intravenous Zometa, this will be directed by your oncologist, especially after you get started on treatment with an antiestrogen.    Follow-up with me in 6 months with repeat labs.  We will send the new lab request after you have your 3 months labs.

## 2020-12-10 NOTE — LETTER
December 10, 2020     Tiera Egan MD  [address] [facility] [address]  [place] [address]    Patient: Agnieszka Burton  YOB: 1945  Date of Visit: 12/10/2020      Dear Dr. Egan:    Thank you for referring Agnieszka Burton to me for evaluation. Below are my notes for this consultation.    If you have questions, please do not hesitate to call me. I look forward to following your patient along with you.         Sincerely,        Aicha Cota MD        CC: No Recipients  Aicha Cota MD  12/10/2020 10:03 AM  Signed        Verification of Patient Location:  The patient affirms they are currently located in the following state: [place]    Request for Consent:   Video Encounter   Olena, my name is Aicha Cota MD.  Before we proceed, can you please verify your identification by telling me your full name and date of birth?  Can you tell me who is in the room with you?    You and I are about to have a telemedicine check-in or visit because you have requested it.  This is a live video-conference.  I am a real person, speaking to you in real time.  There is no one else with me on the video-conference.  However, when we use ([employer], [employer], etc) it is important for you to know that the video-conference may not be secure or private.  I am not recording this conversation and I am asking you not to record it.  This telemedicine visit will be billed to your health insurance or you, if you are self-insured.  You understand you will be responsible for any copayments or coinsurances that apply to your telemedicine visit.  Before starting our telemedicine visit, I am required to get your consent for this virtual check-in or visit by telemedicine. Do you consent?    Patient Response to Request for Consent:  Yes      Visit Documentation:  Subjective     Patient ID: Agnieszka Burton is a 74 y.o. female.  1945      HPI   Last visit; 5/11/10  Right breast lumpectomy(partial mastectomy) 11/16/20. They  are planning a lumpectomy on the left side also. She will most likely need radiation and anti-estrogen therapy.   She lost 10 lbs intentionally recently.     She continues Synthroid (brand) 88 mcg daily, takes it on empty stomach in the morning, eats 1 to 2 hrs later.   Overall she is doing ok.   She has achiness in her knees. She is active.   She has occasional diarrhea. She had colonoscopy February 2020  No heart palpitations.       Supplements:  - vitamin D3 2000 IU daily  - centrum for women 50 plus (has been taking it in the morning),has not been taking lately  - vitamin C  - vitamin E    Diet:  - lactaid with her coffee (2 cups of coffee per day)  - yogurt on and off  - cheese daily    Exercise:   - her dog passed away.  - gardening  - swimming last summer    Social History:  She stopped smoking completely in February of 2017.  She is .  She has one son and one daughter.  She stopped working January of 2018. 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         The following have been reviewed and updated as appropriate in this visit:  Tobacco  Allergies  Meds  Problems  Med Hx  Surg Hx  Fam Hx  Soc Hx        Review of Systems  All other systems reviewed and negative except as noted in HPI    Lab Results   Component Value Date    TSH 0.992 09/21/2020    N8JOLLOJM 1.12 09/21/2020       Laboratory Data: On 5/03/17, her calcium is 9.7, her creatinine is 0.6, her intact PTH is 57.7, her TSH is 1.0, her free T4 is normal, her 25 vitamin D is 33.     DEXA 10/4/18 (Compared to 8/22/16)  T-score  L spine -0.9  Total hip -1.7  Femoral neck -2  Stable at the hip  Increased 2.7 % at the spine    [facility], Radiant Inresults - 10/13/2020  5:09 PM EDT  BONE DENSITOMETRY    Clinical Indication: Menopause.    Comparison date: 10/4/2018    Bone densitometry of the AP lumbar spine and left hip was performed using a [employer] bone densitometer.  The patient is well positioned and the regions of interest are properly placed.    AP spine L1-L4       BMD:  1.093 g/cm2     T-score:  -0.8,   normal            Total hip                   BMD:  0.729 g/cm2     T-score:  -2.2,   osteopenia     Femoral neck          BMD:  0.764 g/cm2     T-score:  -2.0,   osteopenia      There is a decrease of 8.3% in the bone density of the hip and no statistically significant change in the bone density of the lumbar spine.    A FRAX score was also calculated on this patient since the bone density falls within the osteopenic range. Risk factors which were included in this calculation are as follows:    Family history of parent with hip fracture.    The 10-year probability of a major osteoporotic fracture is 23.2%. The 10-year probability of a hip fracture is 11.3%.        IMPRESSION:      Based on the WHO classification, this patient has osteopenia.    Assessment/Plan    Surgical Hypothyroidism    She had total thyroidectomy for a large symptomatic goiter in 2009 by Dr. Frost.   The final surgical pathology was benign.  She had temporary left vocal cord paralysis after the surgery, but she recovered.  She is euthyroid clinically.  She will continue Synthroid 88 mcg daily.  Recheck thyroid levels in 3 months, she may need a lower dose as she continues to lose weight.    Osteopenia    She stopped smoking in 2017.  She did not tolerate Atelvia because of significant joint pain.  She did not tolerate calcium supplements because of abdominal cramps  .  I reviewed the results of her recent bone density.  There is evidence of significant worsening at the hip.  She is a definite candidate for therapy, especially if she is started on antiestrogen therapy.    She may be a candidate for Zometa twice a year in the setting of breast cancer on antiestrogen therapy.  She will be following with her oncologist.    In the meantime she will continue adequate calcium intake in her diet, she will make  sure to take her vitamin D on a regular basis.  Exercise safely     Acid Reflux    Her symptoms are controlled on treatment with Nexium.  It is not interfering with her thyroid levels.      Diagnoses and all orders for this visit:    Post-surgical hypothyroidism (Primary)  -     TSH 3rd Generation; Future  -     T4, free; Future  -     Vitamin D 25 hydroxy; Future    Osteopenia, unspecified location  -     TSH 3rd Generation; Future  -     T4, free; Future  -     Vitamin D 25 hydroxy; Future    Vitamin D deficiency  -     TSH 3rd Generation; Future  -     T4, free; Future  -     Vitamin D 25 hydroxy; Future    Other orders  -     SYNTHROID 88 mcg tablet; Take 1 tablet (88 mcg total) by mouth once daily.        Time Spent in Medical Discussion During This Encounter:      21 minutes

## 2020-12-10 NOTE — PROGRESS NOTES
Verification of Patient Location:  The patient affirms they are currently located in the following state: [place]    Request for Consent:   Video Encounter   Olena, my name is Aicha Cota MD.  Before we proceed, can you please verify your identification by telling me your full name and date of birth?  Can you tell me who is in the room with you?    You and I are about to have a telemedicine check-in or visit because you have requested it.  This is a live video-conference.  I am a real person, speaking to you in real time.  There is no one else with me on the video-conference.  However, when we use ([employer], [employer], etc) it is important for you to know that the video-conference may not be secure or private.  I am not recording this conversation and I am asking you not to record it.  This telemedicine visit will be billed to your health insurance or you, if you are self-insured.  You understand you will be responsible for any copayments or coinsurances that apply to your telemedicine visit.  Before starting our telemedicine visit, I am required to get your consent for this virtual check-in or visit by telemedicine. Do you consent?    Patient Response to Request for Consent:  Yes      Visit Documentation:  Subjective     Patient ID: Agnieszka Burton is a 74 y.o. female.  1945      HPI   Last visit; 5/11/10  Right breast lumpectomy(partial mastectomy) 11/16/20. They are planning a lumpectomy on the left side also. She will most likely need radiation and anti-estrogen therapy.   She lost 10 lbs intentionally recently.     She continues Synthroid (brand) 88 mcg daily, takes it on empty stomach in the morning, eats 1 to 2 hrs later.   Overall she is doing ok.   She has achiness in her knees. She is active.   She has occasional diarrhea. She had colonoscopy February 2020  No heart palpitations.       Supplements:  - vitamin D3 2000 IU daily  - centrum for women 50 plus (has been taking it in the  morning),has not been taking lately  - vitamin C  - vitamin E    Diet:  - lactaid with her coffee (2 cups of coffee per day)  - yogurt on and off  - cheese daily    Exercise:   - her dog passed away.  - gardening  - swimming last summer    Social History:  She stopped smoking completely in February of 2017.  She is .  She has one son and one daughter.  She stopped working January of 2018. 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  She took a [employer] course         The following have been reviewed and updated as appropriate in this visit:  Tobacco  Allergies  Meds  Problems  Med Hx  Surg Hx  Fam Hx  Soc Hx        Review of Systems  All other systems reviewed and negative except as noted in HPI    Lab Results   Component Value Date    TSH 0.992 09/21/2020    X1QOIZDEP 1.12 09/21/2020       Laboratory Data: On 5/03/17, her calcium is 9.7, her creatinine is 0.6, her intact PTH is 57.7, her TSH is 1.0, her free T4 is normal, her 25 vitamin D is 33.     DEXA 10/4/18 (Compared to 8/22/16)  T-score  L spine -0.9  Total hip -1.7  Femoral neck -2  Stable at the hip  Increased 2.7 % at the spine    [facility], Radiant Inresults - 10/13/2020  5:09 PM EDT  BONE DENSITOMETRY    Clinical Indication: Menopause.    Comparison date: 10/4/2018    Bone densitometry of the AP lumbar spine and left hip was performed using a [id number] bone densitometer. The patient is well positioned and the regions of interest are properly placed.    AP spine L1-L4       BMD:  1.093 g/cm2     T-score:  -0.8,   normal            Total hip                   BMD:  0.729 g/cm2     T-score:  -2.2,   osteopenia     Femoral neck          BMD:  0.764 g/cm2     T-score:  -2.0,   osteopenia      There is a decrease of 8.3% in the bone density of the hip and no statistically significant change in the bone density of the lumbar spine.    A FRAX score was also calculated on this patient since the bone  density falls within the osteopenic range. Risk factors which were included in this calculation are as follows:    Family history of parent with hip fracture.    The 10-year probability of a major osteoporotic fracture is 23.2%. The 10-year probability of a hip fracture is 11.3%.        IMPRESSION:      Based on the WHO classification, this patient has osteopenia.    Assessment/Plan    Surgical Hypothyroidism    She had total thyroidectomy for a large symptomatic goiter in 2009 by Dr. Frost.   The final surgical pathology was benign.  She had temporary left vocal cord paralysis after the surgery, but she recovered.  She is euthyroid clinically.  She will continue Synthroid 88 mcg daily.  Recheck thyroid levels in 3 months, she may need a lower dose as she continues to lose weight.    Osteopenia    She stopped smoking in 2017.  She did not tolerate Atelvia because of significant joint pain.  She did not tolerate calcium supplements because of abdominal cramps  .  I reviewed the results of her recent bone density.  There is evidence of significant worsening at the hip.  She is a definite candidate for therapy, especially if she is started on antiestrogen therapy.    She may be a candidate for Zometa twice a year in the setting of breast cancer on antiestrogen therapy.  She will be following with her oncologist.    In the meantime she will continue adequate calcium intake in her diet, she will make sure to take her vitamin D on a regular basis.  Exercise safely     Acid Reflux    Her symptoms are controlled on treatment with Nexium.  It is not interfering with her thyroid levels.      Diagnoses and all orders for this visit:    Post-surgical hypothyroidism (Primary)  -     TSH 3rd Generation; Future  -     T4, free; Future  -     Vitamin D 25 hydroxy; Future    Osteopenia, unspecified location  -     TSH 3rd Generation; Future  -     T4, free; Future  -     Vitamin D 25 hydroxy; Future    Vitamin D deficiency  -     TSH  3rd Generation; Future  -     T4, free; Future  -     Vitamin D 25 hydroxy; Future    Other orders  -     SYNTHROID 88 mcg tablet; Take 1 tablet (88 mcg total) by mouth once daily.        Time Spent in Medical Discussion During This Encounter:      21 minutes

## 2021-05-11 ENCOUNTER — OFFICE VISIT (OUTPATIENT)
Dept: ENDOCRINOLOGY | Facility: CLINIC | Age: 76
End: 2021-05-11
Payer: MEDICARE

## 2021-05-11 DIAGNOSIS — E55.9 VITAMIN D DEFICIENCY: ICD-10-CM

## 2021-05-11 DIAGNOSIS — M85.80 OSTEOPENIA, UNSPECIFIED LOCATION: ICD-10-CM

## 2021-05-11 DIAGNOSIS — E89.0 POST-SURGICAL HYPOTHYROIDISM: ICD-10-CM

## 2021-05-11 PROCEDURE — 99214 OFFICE O/P EST MOD 30 MIN: CPT | Performed by: INTERNAL MEDICINE

## 2021-05-11 NOTE — PATIENT INSTRUCTIONS
- continue Synthroid 88 mcg daily    - the anastrozole can affect how much thyroid medication you need    - get blood test done mid-June  Send me a portal message the day after, so I can adjust your dosage if needed.     - continue vitamin D3, 2000 IU daily    - follow up with Dr. Romano for the bone density, especially that you are taking the anastrozole.

## 2021-05-11 NOTE — PROGRESS NOTES
ENDOCRINOLOGY NOTE       Subjective    HPI:  Agnieszak Burton is a 75 y.o. female who presents for follow up for thyroid disease and osteopenia    Last visit: 12/10/2020  She continues Synthroid (brand) 88 mcg daily, takes it on empty stomach in the morning, eats 1 to 2 hrs later.     Right breast lumpectomy(partial mastectomy) 11/16/20.   Left breast lumpectomy 12/22/20  Left re-excision 1/11/2021  Left breast mastectomy with reconstruction (flap) 3/30/2021    No chemo or radiation. She was started on anastrozole on 4/30/3021, she follows with Dr. Anshul Romano in oncology.     She lost 10 lbs in the fall, but she regained it. She started changing her diet again recently.  Overall she is doing ok.   She has achiness in her knees. She is active.   No heart palpitations.         Supplements:  - vitamin D3 4000 IU daily (she increased her dose in December)  - centrum for women 50 plus (has been taking it in the morning),she has not been taking it since the fall of 2020.   - vitamin C  - vitamin E     Diet:  - lactaid with her coffee (2 cups of coffee per day)  - yogurt on and off  - cheese daily     Exercise:   - aiming for walking 2.2 miles per day  - gardening  - swimming last summer     Social History:    She stopped smoking completely in February of 2017.  She is .  She has one son and one daughter.  She stopped working January of 2018. 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.  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    Medications:    Current Outpatient Medications:   •  ascorbic acid, vitamin C, (VITAMIN C) 500 mg CR capsule, 500 mg., Disp: , Rfl:   •  cholecalciferol, vitamin D3, (VITAMIN D3) 2,000 unit tablet, take 1 by oral route  every day, Disp: , Rfl:   •  fexofenadine-pseudoephedrine (ALLEGRA-D 24 HOUR) 180-240 mg per 24 hr tablet, take 1 tablet by oral route  every day on an empty stomach with a glass of water, Disp: , Rfl:   •  rosuvastatin (CRESTOR) 10  mg tablet, 10 mg., Disp: , Rfl:   •  SYNTHROID 88 mcg tablet, Take 1 tablet (88 mcg total) by mouth once daily., Disp: 90 tablet, Rfl: 3  •  vitamin E 400 unit capsule, take 2 pills daily, Disp: , Rfl:      Allergies:  Iodinated contrast media, Latex, Penicillin, and Sulfa (sulfonamide antibiotics)     Medical History:  Past Medical History:   Diagnosis Date   • Acid reflux    • Disease of thyroid gland    • Lipid disorder        Surgical History:   Past Surgical History:   Procedure Laterality Date   • APPENDECTOMY     • HYSTERECTOMY     • NEPHROPEXY     • THYROIDECTOMY         Family History:  Family History   Problem Relation Age of Onset   • Coronary artery disease Biological Mother    • Osteoporosis Biological Mother    • COPD Biological Father    • Heart attack Biological Father        Social history:  Social History     Socioeconomic History   • Marital status: Single     Spouse name: Not on file   • Number of children: Not on file   • Years of education: Not on file   • Highest education level: Not on file   Occupational History   • Not on file   Tobacco Use   • Smoking status: Former Smoker     Quit date:      Years since quittin.3   • Smokeless tobacco: Never Used   Substance and Sexual Activity   • Alcohol use: Yes     Comment: social    • Drug use: No   • Sexual activity: Not on file   Other Topics Concern   • Not on file   Social History Narrative   • Not on file     Social Determinants of Health     Financial Resource Strain:    • Difficulty of Paying Living Expenses:    Food Insecurity:    • Worried About Running Out of Food in the Last Year:    • Ran Out of Food in the Last Year:    Transportation Needs:    • Lack of Transportation (Medical):    • Lack of Transportation (Non-Medical):    Physical Activity:    • Days of Exercise per Week:    • Minutes of Exercise per Session:    Stress:    • Feeling of Stress :    Social Connections:    • Frequency of Communication with Friends and Family:     • Frequency of Social Gatherings with Friends and Family:    • Attends [employer] Services:    • Active Member of Clubs or Organizations:    • Attends Club or Organization Meetings:    • Marital Status:    Intimate Partner Violence:    • Fear of Current or Ex-Partner:    • Emotionally Abused:    • Physically Abused:    • Sexually Abused:        Review of Systems  All other systems reviewed and negative except as noted in HPI    Objective   There were no vitals filed for this visit.  There is no height or weight on file to calculate BMI.    Physical Exam   Constitutional: She appears well-developed.   HENT:   Head: Normocephalic and atraumatic.   Eyes: Pupils are equal, round, and reactive to light. Conjunctivae and EOM are normal.   Neck: No tracheal deviation present. No thyromegaly present.   Neck surgical scars well-healed   Cardiovascular: Normal rate, regular rhythm and normal heart sounds.    Pulmonary/Chest: Effort normal.   Musculoskeletal: She exhibits no edema.   Lymphadenopathy:     She has no cervical adenopathy.   Neurological: She displays normal reflexes.   Skin: No rash noted.   Psychiatric: She has a normal mood and affect.    Lab Results   Component Value Date    TSH 0.992 09/21/2020    TSH 0.90 06/08/2018    FREET4 0.78 06/08/2018    ALT 18 09/21/2020        Lab Results   Component Value Date    CREATININE 0.67 09/21/2020    K 4.1 09/21/2020     Lab Results   Component Value Date    TSH 0.992 09/21/2020       DEXA 10/4/18 (Compared to 8/22/16)  T-score  L spine -0.9  Total hip -1.7  Femoral neck -2  Stable at the hip  Increased 2/7 % at the spine      [facility], Radiant Inresults - 10/13/2020  5:09 PM EDT  BONE DENSITOMETRY    Clinical Indication: Menopause.    Comparison date: 10/4/2018    Bone densitometry of the AP lumbar spine and left hip was performed using a [employer] bone densitometer. The patient is well positioned and the regions of interest are properly placed.    AP spine L1-L4       BMD:   1.093 g/cm2     T-score:  -0.8,   normal            Total hip                   BMD:  0.729 g/cm2     T-score:  -2.2,   osteopenia     Femoral neck          BMD:  0.764 g/cm2     T-score:  -2.0,   osteopenia      There is a decrease of 8.3% in the bone density of the hip and no statistically significant change in the bone density of the lumbar spine.    A FRAX score was also calculated on this patient since the bone density falls within the osteopenic range. Risk factors which were included in this calculation are as follows:    Family history of parent with hip fracture.    The 10-year probability of a major osteoporotic fracture is 23.2%. The 10-year probability of a hip fracture is 11.3%.      Assessment/Plan    Surgical Hypothyroidism    She had total thyroidectomy for a large symptomatic goiter in 2009 by Dr. Frost.   The final surgical pathology was benign.  She had temporary left vocal cord paralysis after the surgery, but she recovered.  She is euthyroid clinically.  She will continue Synthroid 88 mcg daily.  She was recently started on treatment with anastrozole.  She will recheck her thyroid levels mid June in case we need to make an adjustment in her thyroid dosage.     Osteopenia    She stopped smoking in 2017.  She did not tolerate Atelvia in 2017 because of significant generalized joint pain.  She did not tolerate calcium supplements because of abdominal cramps  .  She is now on treatment with anastrozole  She may be a candidate for Zometa twice a year in the setting of breast cancer on antiestrogen therapy.  She will be following with her oncologist.     In the meantime she will continue adequate calcium intake in her diet, she will make sure to take her vitamin D on a regular basis.  Exercise safely     Acid Reflux    Her symptoms are controlled on treatment with Nexium.  It is not interfering with her thyroid levels.          I spent 25 minutes on this date of service performing the following  activities: obtaining history, performing examination, entering orders, documenting, preparing for visit, obtaining / reviewing records, providing counseling and education, independently reviewing study/studies and coordinating care.    Patient Instructions   - continue Synthroid 88 mcg daily    - the anastrozole can affect how much thyroid medication you need    - get blood test done mid-June  Send me a portal message the day after, so I can adjust your dosage if needed.     - continue vitamin D3, 2000 IU daily    - follow up with Dr. Romano for the bone density, especially that you are taking the anastrozole.           Aicha Cota MD  5/11/2021

## 2021-05-11 NOTE — LETTER
May 11, 2021     Tiera Egan MD  [address] [facility]  [address]  [place] [address]    Patient: Agnieszka Burton  YOB: 1945  Date of Visit: 5/11/2021      Dear Dr. Egan:    Thank you for referring Agnieszka Burton to me for evaluation. Below are my notes for this consultation.    If you have questions, please do not hesitate to call me. I look forward to following your patient along with you.         Sincerely,        Aicha Cota MD        CC: No Recipients  Aicha Cota MD  5/11/2021  5:11 PM  Signed       ENDOCRINOLOGY NOTE       Subjective    HPI:  Agnieszka Burton is a 75 y.o. female who presents for follow up for thyroid disease and osteopenia    Last visit: 12/10/2020  She continues Synthroid (brand) 88 mcg daily, takes it on empty stomach in the morning, eats 1 to 2 hrs later.     Right breast lumpectomy(partial mastectomy) 11/16/20.   Left breast lumpectomy 12/22/20  Left re-excision 1/11/2021  Left breast mastectomy with reconstruction (flap) 3/30/2021    No chemo or radiation. She was started on anastrozole on 4/30/3021, she follows with Dr. Anshul Romano in oncology.     She lost 10 lbs in the fall, but she regained it. She started changing her diet again recently.  Overall she is doing ok.   She has achiness in her knees. She is active.   No heart palpitations.         Supplements:  - vitamin D3 4000 IU daily (she increased her dose in December)  - centrum for women 50 plus (has been taking it in the morning),she has not been taking it since the fall of 2020.   - vitamin C  - vitamin E     Diet:  - lactaid with her coffee (2 cups of coffee per day)  - yogurt on and off  - cheese daily     Exercise:   - aiming for walking 2.2 miles per day  - gardening  - swimming last summer     Social History:    She stopped smoking completely in February of 2017.  She is .  She has one son and one daughter.  She stopped working January of 2018. 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    Medications:    Current Outpatient Medications:   •  ascorbic acid, vitamin C, (VITAMIN C) 500 mg CR capsule, 500 mg., Disp: , Rfl:   •  cholecalciferol, vitamin D3, (VITAMIN D3) 2,000 unit tablet, take 1 by oral route  every day, Disp: , Rfl:   •  fexofenadine-pseudoephedrine (ALLEGRA-D 24 HOUR) 180-240 mg per 24 hr tablet, take 1 tablet by oral route  every day on an empty stomach with a glass of water, Disp: , Rfl:   •  rosuvastatin (CRESTOR) 10 mg tablet, 10 mg., Disp: , Rfl:   •  SYNTHROID 88 mcg tablet, Take 1 tablet (88 mcg total) by mouth once daily., Disp: 90 tablet, Rfl: 3  •  vitamin E 400 unit capsule, take 2 pills daily, Disp: , Rfl:      Allergies:  Iodinated contrast media, Latex, Penicillin, and Sulfa (sulfonamide antibiotics)     Medical History:  Past Medical History:   Diagnosis Date   • Acid reflux    • Disease of thyroid gland    • Lipid disorder        Surgical History:   Past Surgical History:   Procedure Laterality Date   • APPENDECTOMY     • HYSTERECTOMY     • NEPHROPEXY     • THYROIDECTOMY         Family History:  Family History   Problem Relation Age of Onset   • Coronary artery disease Biological Mother    • Osteoporosis Biological Mother    • COPD Biological Father    • Heart attack Biological Father        Social history:  Social History     Socioeconomic History   • Marital status: Single     Spouse name: Not on file   • Number of children: Not on file   • Years of education: Not on file   • Highest education level: Not on file   Occupational History   • Not on file   Tobacco Use   • Smoking status: Former Smoker     Quit date:      Years since quittin.3   • Smokeless tobacco: Never Used   Substance and Sexual Activity   • Alcohol use: Yes     Comment: social    • Drug use: No   • Sexual activity: Not on file   Other Topics Concern   • Not on file   Social History Narrative   • Not on file      Social Determinants of Health     Financial Resource Strain:    • Difficulty of Paying Living Expenses:    Food Insecurity:    • Worried About Running Out of Food in the Last Year:    • Ran Out of Food in the Last Year:    Transportation Needs:    • Lack of Transportation (Medical):    • Lack of Transportation (Non-Medical):    Physical Activity:    • Days of Exercise per Week:    • Minutes of Exercise per Session:    Stress:    • Feeling of Stress :    Social Connections:    • Frequency of Communication with Friends and Family:    • Frequency of Social Gatherings with Friends and Family:    • Attends Christian Services:    • Active Member of Clubs or Organizations:    • Attends Club or Organization Meetings:    • Marital Status:    Intimate Partner Violence:    • Fear of Current or Ex-Partner:    • Emotionally Abused:    • Physically Abused:    • Sexually Abused:        Review of Systems  All other systems reviewed and negative except as noted in HPI    Objective   There were no vitals filed for this visit.  There is no height or weight on file to calculate BMI.    Physical Exam   Constitutional: She appears well-developed.   HENT:   Head: Normocephalic and atraumatic.   Eyes: Pupils are equal, round, and reactive to light. Conjunctivae and EOM are normal.   Neck: No tracheal deviation present. No thyromegaly present.   Neck surgical scars well-healed   Cardiovascular: Normal rate, regular rhythm and normal heart sounds.    Pulmonary/Chest: Effort normal.   Musculoskeletal: She exhibits no edema.   Lymphadenopathy:     She has no cervical adenopathy.   Neurological: She displays normal reflexes.   Skin: No rash noted.   Psychiatric: She has a normal mood and affect.    Lab Results   Component Value Date    TSH 0.992 09/21/2020    TSH 0.90 06/08/2018    FREET4 0.78 06/08/2018    ALT 18 09/21/2020        Lab Results   Component Value Date    CREATININE 0.67 09/21/2020    K 4.1 09/21/2020     Lab Results    Component Value Date    TSH 0.992 09/21/2020       DEXA 10/4/18 (Compared to 8/22/16)  T-score  L spine -0.9  Total hip -1.7  Femoral neck -2  Stable at the hip  Increased 2/7 % at the spine      [facility], Radiant Inresults - 10/13/2020  5:09 PM EDT  BONE DENSITOMETRY    Clinical Indication: Menopause.    Comparison date: 10/4/2018    Bone densitometry of the AP lumbar spine and left hip was performed using a GE bone densitometer. The patient is well positioned and the regions of interest are properly placed.    AP spine L1-L4       BMD:  1.093 g/cm2     T-score:  -0.8,   normal            Total hip                   BMD:  0.729 g/cm2     T-score:  -2.2,   osteopenia     Femoral neck          BMD:  0.764 g/cm2     T-score:  -2.0,   osteopenia      There is a decrease of 8.3% in the bone density of the hip and no statistically significant change in the bone density of the lumbar spine.    A FRAX score was also calculated on this patient since the bone density falls within the osteopenic range. Risk factors which were included in this calculation are as follows:    Family history of parent with hip fracture.    The 10-year probability of a major osteoporotic fracture is 23.2%. The 10-year probability of a hip fracture is 11.3%.      Assessment/Plan    Surgical Hypothyroidism    She had total thyroidectomy for a large symptomatic goiter in 2009 by Dr. Frost.   The final surgical pathology was benign.  She had temporary left vocal cord paralysis after the surgery, but she recovered.  She is euthyroid clinically.  She will continue Synthroid 88 mcg daily.  She was recently started on treatment with anastrozole.  She will recheck her thyroid levels mid June in case we need to make an adjustment in her thyroid dosage.     Osteopenia    She stopped smoking in 2017.  She did not tolerate Atelvia in 2017 because of significant generalized joint pain.  She did not tolerate calcium supplements because of abdominal  cramps  .  She is now on treatment with anastrozole  She may be a candidate for Zometa twice a year in the setting of breast cancer on antiestrogen therapy.  She will be following with her oncologist.     In the meantime she will continue adequate calcium intake in her diet, she will make sure to take her vitamin D on a regular basis.  Exercise safely     Acid Reflux    Her symptoms are controlled on treatment with Nexium.  It is not interfering with her thyroid levels.          I spent 25 minutes on this date of service performing the following activities: obtaining history, performing examination, entering orders, documenting, preparing for visit, obtaining / reviewing records, providing counseling and education, independently reviewing study/studies and coordinating care.    Patient Instructions   - continue Synthroid 88 mcg daily    - the anastrozole can affect how much thyroid medication you need    - get blood test done mid-June  Send me a portal message the day after, so I can adjust your dosage if needed.     - continue vitamin D3, 2000 IU daily    - follow up with Dr. Romano for the bone density, especially that you are taking the anastrozole.           Aicha Cota MD  5/11/2021

## 2021-08-03 RX ORDER — LEVOTHYROXINE SODIUM 88 UG/1
88 TABLET ORAL
Qty: 90 TABLET | Refills: 3 | Status: SHIPPED | OUTPATIENT
Start: 2021-08-03 | End: 2021-11-18

## 2021-08-03 RX ORDER — LEVOTHYROXINE SODIUM 88 UG/1
TABLET ORAL
Qty: 90 TABLET | Refills: 3 | Status: SHIPPED | OUTPATIENT
Start: 2021-08-03 | End: 2021-08-03

## 2021-08-03 RX ORDER — LEVOTHYROXINE SODIUM 88 UG/1
88 TABLET ORAL
Qty: 90 TABLET | Refills: 3 | Status: SHIPPED | OUTPATIENT
Start: 2021-08-03 | End: 2021-11-18 | Stop reason: SDUPTHER

## 2021-09-23 ENCOUNTER — HOSPITAL ENCOUNTER (EMERGENCY)
Facility: HOSPITAL | Age: 76
Discharge: HOME | End: 2021-09-23
Attending: EMERGENCY MEDICINE
Payer: MEDICARE

## 2021-09-23 ENCOUNTER — APPOINTMENT (EMERGENCY)
Dept: RADIOLOGY | Facility: HOSPITAL | Age: 76
End: 2021-09-23
Payer: MEDICARE

## 2021-09-23 VITALS
OXYGEN SATURATION: 96 % | TEMPERATURE: 97.8 F | RESPIRATION RATE: 18 BRPM | HEIGHT: 63 IN | WEIGHT: 150 LBS | HEART RATE: 60 BPM | DIASTOLIC BLOOD PRESSURE: 75 MMHG | SYSTOLIC BLOOD PRESSURE: 172 MMHG | BODY MASS INDEX: 26.58 KG/M2

## 2021-09-23 DIAGNOSIS — R42 VERTIGO: Primary | ICD-10-CM

## 2021-09-23 LAB
ALBUMIN SERPL-MCNC: 4 G/DL (ref 3.4–5)
ALP SERPL-CCNC: 71 IU/L (ref 35–126)
ALT SERPL-CCNC: 17 IU/L (ref 11–54)
ANION GAP SERPL CALC-SCNC: 10 MEQ/L (ref 3–15)
AST SERPL-CCNC: 17 IU/L (ref 15–41)
BACTERIA URNS QL MICRO: ABNORMAL /HPF
BASOPHILS # BLD: 0.04 K/UL (ref 0.01–0.1)
BASOPHILS NFR BLD: 0.7 %
BILIRUB SERPL-MCNC: 0.8 MG/DL (ref 0.3–1.2)
BILIRUB UR QL STRIP.AUTO: NEGATIVE MG/DL
BUN SERPL-MCNC: 14 MG/DL (ref 8–20)
CALCIUM SERPL-MCNC: 9.2 MG/DL (ref 8.9–10.3)
CHLORIDE SERPL-SCNC: 104 MEQ/L (ref 98–109)
CLARITY UR REFRACT.AUTO: ABNORMAL
CO2 SERPL-SCNC: 27 MEQ/L (ref 22–32)
COLOR UR AUTO: YELLOW
CREAT SERPL-MCNC: 0.7 MG/DL (ref 0.6–1.1)
DIFFERENTIAL METHOD BLD: ABNORMAL
EOSINOPHIL # BLD: 0.08 K/UL (ref 0.04–0.36)
EOSINOPHIL NFR BLD: 1.3 %
ERYTHROCYTE [DISTWIDTH] IN BLOOD BY AUTOMATED COUNT: 13.6 % (ref 11.7–14.4)
GFR SERPL CREATININE-BSD FRML MDRD: >60 ML/MIN/1.73M*2
GLUCOSE SERPL-MCNC: 112 MG/DL (ref 70–99)
GLUCOSE UR STRIP.AUTO-MCNC: NEGATIVE MG/DL
HCT VFR BLDCO AUTO: 42.8 % (ref 35–45)
HGB BLD-MCNC: 14 G/DL (ref 11.8–15.7)
HGB UR QL STRIP.AUTO: NEGATIVE
HYALINE CASTS #/AREA URNS LPF: ABNORMAL /LPF
IMM GRANULOCYTES # BLD AUTO: 0.02 K/UL (ref 0–0.08)
IMM GRANULOCYTES NFR BLD AUTO: 0.3 %
KETONES UR STRIP.AUTO-MCNC: NEGATIVE MG/DL
LEUKOCYTE ESTERASE UR QL STRIP.AUTO: NEGATIVE
LYMPHOCYTES # BLD: 1.18 K/UL (ref 1.2–3.5)
LYMPHOCYTES NFR BLD: 19.5 %
MCH RBC QN AUTO: 30.6 PG (ref 28–33.2)
MCHC RBC AUTO-ENTMCNC: 32.7 G/DL (ref 32.2–35.5)
MCV RBC AUTO: 93.7 FL (ref 83–98)
MONOCYTES # BLD: 0.43 K/UL (ref 0.28–0.8)
MONOCYTES NFR BLD: 7.1 %
NEUTROPHILS # BLD: 4.3 K/UL (ref 1.7–7)
NEUTS SEG NFR BLD: 71.1 %
NITRITE UR QL STRIP.AUTO: NEGATIVE
NRBC BLD-RTO: 0 %
PDW BLD AUTO: 12.1 FL (ref 9.4–12.3)
PH UR STRIP.AUTO: 8 [PH]
PLATELET # BLD AUTO: 182 K/UL (ref 150–369)
POTASSIUM SERPL-SCNC: 4.1 MEQ/L (ref 3.6–5.1)
PROT SERPL-MCNC: 6.8 G/DL (ref 6–8.2)
PROT UR QL STRIP.AUTO: NEGATIVE
RBC # BLD AUTO: 4.57 M/UL (ref 3.93–5.22)
RBC #/AREA URNS HPF: ABNORMAL /HPF
SODIUM SERPL-SCNC: 141 MEQ/L (ref 136–144)
SP GR UR REFRACT.AUTO: 1.01
SQUAMOUS URNS QL MICRO: ABNORMAL /HPF
TROPONIN I SERPL-MCNC: <0.02 NG/ML
UROBILINOGEN UR STRIP-ACNC: 0.2 EU/DL
WBC # BLD AUTO: 6.05 K/UL (ref 3.8–10.5)
WBC #/AREA URNS HPF: ABNORMAL /HPF

## 2021-09-23 PROCEDURE — 99284 EMERGENCY DEPT VISIT MOD MDM: CPT | Mod: 25

## 2021-09-23 PROCEDURE — 80053 COMPREHEN METABOLIC PANEL: CPT | Performed by: PHYSICIAN ASSISTANT

## 2021-09-23 PROCEDURE — 63700000 HC SELF-ADMINISTRABLE DRUG: Performed by: PHYSICIAN ASSISTANT

## 2021-09-23 PROCEDURE — 3E0337Z INTRODUCTION OF ELECTROLYTIC AND WATER BALANCE SUBSTANCE INTO PERIPHERAL VEIN, PERCUTANEOUS APPROACH: ICD-10-PCS | Performed by: EMERGENCY MEDICINE

## 2021-09-23 PROCEDURE — 84484 ASSAY OF TROPONIN QUANT: CPT | Performed by: PHYSICIAN ASSISTANT

## 2021-09-23 PROCEDURE — 36415 COLL VENOUS BLD VENIPUNCTURE: CPT | Performed by: PHYSICIAN ASSISTANT

## 2021-09-23 PROCEDURE — G1004 CDSM NDSC: HCPCS

## 2021-09-23 PROCEDURE — 81001 URINALYSIS AUTO W/SCOPE: CPT | Performed by: PHYSICIAN ASSISTANT

## 2021-09-23 PROCEDURE — 85025 COMPLETE CBC W/AUTO DIFF WBC: CPT | Performed by: PHYSICIAN ASSISTANT

## 2021-09-23 PROCEDURE — 25800000 HC PHARMACY IV SOLUTIONS: Performed by: PHYSICIAN ASSISTANT

## 2021-09-23 PROCEDURE — 93005 ELECTROCARDIOGRAM TRACING: CPT | Performed by: PHYSICIAN ASSISTANT

## 2021-09-23 RX ORDER — MECLIZINE HYDROCHLORIDE 25 MG/1
25 TABLET ORAL 3 TIMES DAILY PRN
Qty: 30 TABLET | Refills: 0 | Status: SHIPPED | OUTPATIENT
Start: 2021-09-23 | End: 2021-09-23 | Stop reason: SDUPTHER

## 2021-09-23 RX ORDER — ANASTROZOLE 1 MG/1
TABLET ORAL
COMMUNITY
Start: 2021-09-17

## 2021-09-23 RX ORDER — MECLIZINE HYDROCHLORIDE 25 MG/1
25 TABLET ORAL ONCE
Status: COMPLETED | OUTPATIENT
Start: 2021-09-23 | End: 2021-09-23

## 2021-09-23 RX ORDER — MECLIZINE HYDROCHLORIDE 25 MG/1
25 TABLET ORAL 3 TIMES DAILY PRN
Qty: 30 TABLET | Refills: 0 | Status: SHIPPED | OUTPATIENT
Start: 2021-09-23 | End: 2021-11-18

## 2021-09-23 RX ADMIN — SODIUM CHLORIDE 1000 ML: 9 INJECTION, SOLUTION INTRAVENOUS at 10:36

## 2021-09-23 RX ADMIN — MECLIZINE HYDROCHLORIDE 25 MG: 25 TABLET ORAL at 14:22

## 2021-09-23 ASSESSMENT — ENCOUNTER SYMPTOMS
FEVER: 0
SHORTNESS OF BREATH: 0
SEIZURES: 0
HEMATURIA: 0
WEAKNESS: 0
NAUSEA: 0
ACTIVITY CHANGE: 0
COUGH: 0
DIARRHEA: 0
DIAPHORESIS: 0
HEADACHES: 0
COLOR CHANGE: 0
DIZZINESS: 1
SPEECH DIFFICULTY: 0
WHEEZING: 0
DIFFICULTY URINATING: 0
NECK PAIN: 0
ABDOMINAL PAIN: 0
VOMITING: 0
AGITATION: 0
FACIAL SWELLING: 0
SORE THROAT: 0
BACK PAIN: 0

## 2021-09-23 NOTE — ED PROVIDER NOTES
Emergency Medicine Note  HPI   HISTORY OF PRESENT ILLNESS     Is a 75-year-old female with a history of breast cancer, thyroid disease, acid reflux who presents today with dizziness 7 hours ago.  Patient states she woke up out of her sleep with a feeling of dizziness, room spinning.  Patient states that she has had ongoing fullness in her left ear over the last month after having a hot tub therapy session her patient believes she got water in her left ear.  Patient denies: Fevers, congestion, cough, chest pain, shortness of breath, vomiting, diarrhea, syncope, seizure.            Patient History   PAST HISTORY     Reviewed from Nursing Triage:      Past Medical History:   Diagnosis Date   • Acid reflux    • Breast cancer (CMS/HCC)    • Disease of thyroid gland    • Hx of tonsillectomy    • Lipid disorder        Past Surgical History:   Procedure Laterality Date   • APPENDECTOMY     • HYSTERECTOMY     • MASTECTOMY Left    • NEPHROPEXY     • THYROIDECTOMY         Family History   Problem Relation Age of Onset   • Coronary artery disease Biological Mother    • Osteoporosis Biological Mother    • COPD Biological Father    • Heart attack Biological Father        Social History     Tobacco Use   • Smoking status: Former Smoker     Quit date:      Years since quittin.7   • Smokeless tobacco: Never Used   Substance Use Topics   • Alcohol use: Yes     Comment: social    • Drug use: No         Review of Systems   REVIEW OF SYSTEMS     Review of Systems   Constitutional: Negative for activity change, diaphoresis and fever.   HENT: Negative for facial swelling and sore throat.    Eyes: Negative for visual disturbance.   Respiratory: Negative for cough, shortness of breath and wheezing.    Cardiovascular: Negative for chest pain and leg swelling.   Gastrointestinal: Negative for abdominal pain, diarrhea, nausea and vomiting.   Genitourinary: Negative for difficulty urinating and hematuria.   Musculoskeletal: Negative  What Type Of Note Output Would You Prefer (Optional)?: Standard Output How Severe Is Your Skin Lesion?: mild for back pain and neck pain.   Skin: Negative for color change and pallor.   Neurological: Positive for dizziness. Negative for seizures, syncope, speech difficulty, weakness and headaches.   Psychiatric/Behavioral: Negative for agitation and behavioral problems.   All other systems reviewed and are negative.        VITALS     ED Vitals    Date/Time Temp Pulse Resp BP SpO2 [facility]   09/23/21 1419 36.6 °C (97.8 °F) -- 18 172/75 -- [facility]   09/23/21 1400 -- 60 24 -- 96 % [facility]   09/23/21 1203 -- 61 16 166/69 96 % [facility]   09/23/21 1019 36.4 °C (97.6 °F) 65 16 151/72 94 % [facility]        Pulse Ox %: 99 % (09/23/21 1339)  Pulse Ox Interpretation: Normal (09/23/21 1339)  Heart Rate: 63 (09/23/21 1339)  Rhythm Strip Interpretation: Normal Sinus Rhythm (09/23/21 1339)     Physical Exam   PHYSICAL EXAM     Physical Exam  Vitals and nursing note reviewed.   Constitutional:       General: She is in acute distress (Patient appears uncomfortable and is unable to complete eye exam secondary to dizziness).      Appearance: Normal appearance. She is well-developed and normal weight. She is not ill-appearing, toxic-appearing or diaphoretic.   HENT:      Head: Normocephalic and atraumatic.      Right Ear: Tympanic membrane, ear canal and external ear normal.      Left Ear: External ear normal.      Ears:      Comments: Redness in the left canal     Nose: Nose normal.      Mouth/Throat:      Mouth: Mucous membranes are moist.   Eyes:      Conjunctiva/sclera: Conjunctivae normal.      Pupils: Pupils are equal, round, and reactive to light.   Cardiovascular:      Rate and Rhythm: Normal rate and regular rhythm.   Pulmonary:      Effort: Pulmonary effort is normal.      Breath sounds: Normal breath sounds.   Abdominal:      General: There is no distension.      Palpations: Abdomen is soft. There is no mass.      Tenderness: There is no abdominal tenderness.   Musculoskeletal:         General: No tenderness or deformity. Normal range of motion.       Cervical back: Normal range of motion.   Skin:     General: Skin is warm and dry.   Neurological:      General: No focal deficit present.      Mental Status: She is alert and oriented to person, place, and time. Mental status is at baseline.   Psychiatric:         Mood and Affect: Mood normal.         Behavior: Behavior normal.         Thought Content: Thought content normal.         Judgment: Judgment normal.           PROCEDURES     Procedures     DATA     Results     Procedure Component Value Units Date/Time    UA with reflex culture [[id number]]  (Abnormal) Collected: 09/23/21 1205    Specimen: Urine, Clean Catch Updated: 09/23/21 1227    Narrative:      The following orders were created for panel order UA with reflex culture.  Procedure                               Abnormality         Status                     ---------                               -----------         ------                     UA Reflex to Culture (Ma...[[id number]]  Abnormal            Final result               UA Microscopic[[id number]]               Abnormal            Final result                 Please view results for these tests on the individual orders.    UA Microscopic [[id number]]  (Abnormal) Collected: 09/23/21 1205    Specimen: Urine, Clean Catch Updated: 09/23/21 1227     RBC, Urine 0 TO 4 /HPF      WBC, Urine 0 TO 3 /HPF      Squamous Epithelial None Seen /hpf      Hyaline Cast 0 TO 2 /lpf      Bacteria, Urine None Seen /HPF     UA Reflex to Culture (Macroscopic) [[id number]]  (Abnormal) Collected: 09/23/21 1205    Specimen: Urine, Clean Catch Updated: 09/23/21 1225     Color, Urine Yellow     Clarity, Urine Cloudy     Specific Gravity, Urine 1.008     pH, Urine 8.0     Leukocyte Esterase Negative     Comment: Results can be falsely negative due to high specific gravity, some antibiotics, glucose >3 g/dl, or WBC other than neutrophils.        Nitrite, Urine Negative     Protein, Urine Negative     Glucose, Urine Negative mg/dL   Has Your Skin Lesion Been Treated?: not been treated     Ketones, Urine Negative mg/dL      Urobilinogen, Urine 0.2 EU/dL      Bilirubin, Urine Negative mg/dL      Blood, Urine Negative     Comment: The sensitivity of the occult blood test is equivalent to approximately 4 intact RBC/HPF.       Troponin I [[id number]]  (Normal) Collected: 09/23/21 1034    Specimen: Blood, Venous Updated: 09/23/21 1109     Troponin I <0.02 ng/mL     Comprehensive metabolic panel [[id number]]  (Abnormal) Collected: 09/23/21 1034    Specimen: Blood, Venous Updated: 09/23/21 1104     Sodium 141 mEQ/L      Potassium 4.1 mEQ/L      Comment: Results obtained on plasma. Plasma Potassium values may be up to 0.4 mEQ/L less than serum values. The differences may be greater for patients with high platelet or white cell counts.        Chloride 104 mEQ/L      CO2 27 mEQ/L      BUN 14 mg/dL      Creatinine 0.7 mg/dL      Glucose 112 mg/dL      Calcium 9.2 mg/dL      AST (SGOT) 17 IU/L      ALT (SGPT) 17 IU/L      Alkaline Phosphatase 71 IU/L      Total Protein 6.8 g/dL      Comment: Test performed on plasma which typically contains approximately 0.4 g/dL more protein than serum.        Albumin 4.0 g/dL      Bilirubin, Total 0.8 mg/dL      eGFR >60.0 mL/min/1.73m*2      Anion Gap 10 mEQ/L     CBC and differential [[id number]]  (Abnormal) Collected: 09/23/21 1034    Specimen: Blood, Venous Updated: 09/23/21 1053     WBC 6.05 K/uL      RBC 4.57 M/uL      Hemoglobin 14.0 g/dL      Hematocrit 42.8 %      MCV 93.7 fL      MCH 30.6 pg      MCHC 32.7 g/dL      RDW 13.6 %      Platelets 182 K/uL      MPV 12.1 fL      Differential Type Auto     nRBC 0.0 %      Immature Granulocytes 0.3 %      Neutrophils 71.1 %      Lymphocytes 19.5 %      Monocytes 7.1 %      Eosinophils 1.3 %      Basophils 0.7 %      Immature Granulocytes, Absolute 0.02 K/uL      Neutrophils, Absolute 4.30 K/uL      Lymphocytes, Absolute 1.18 K/uL      Monocytes, Absolute 0.43 K/uL      Eosinophils, Absolute 0.08 K/uL      Basophils, Absolute  Is This A New Presentation, Or A Follow-Up?: Skin Lesion 0.04 K/uL     RAINBOW RED [[id number]] Collected: 09/23/21 1034    Specimen: Blood, Venous Updated: 09/23/21 1044    [place] Draw Panel [[id number]] Collected: 09/23/21 1034    Specimen: Blood, Venous Updated: 09/23/21 1043    Narrative:      The following orders were created for panel order [place] Draw Panel.  Procedure                               Abnormality         Status                     ---------                               -----------         ------                     RAINBOW DMITRI[[id number]]                                      In process                 RAINBOW GOLD[[id number]]                                     In process                   Please view results for these tests on the individual orders.    GAB GOLD [[id number]] Collected: 09/23/21 1034    Specimen: Blood, Venous Updated: 09/23/21 1043          Imaging Results          CT HEAD WITHOUT IV CONTRAST (Final result)  Result time 09/23/21 11:13:17    Final result                 Impression:    IMPRESSION:  1. No CT evidence for an acute large vascular territorial infarct, acute  intracranial hemorrhage or mass effect.  2. Chronic microangiopathic ischemic disease and age-related involutional  changes.             Narrative:      CLINICAL HISTORY:  Dizziness.    COMMENT:  An unenhanced CT scan of the brain was performed from the foramen magnum to the  vertex. Coronal and sagittal reconstructions were obtained.    CT DOSE:  One or more dose reduction techniques (e.g. automated exposure  control, adjustment of the mA and/or kV according to patient size, use of  iterative reconstruction technique) utilized for this examination.    Comparison: Correlation is made with the report from the prior examination  performed 7/19/2018 as well as with the prior examination performed 3/6/2017.  The 2018 examination is not available for comparison at the time of  interpretation as it is archived.      Findings:  The ventricles, sulci and cisternal spaces are  Which Family Member (Optional)?: Sister prominent consistent with  involutional changes.  Mild patchy areas of periventricular and subcortical  white matter hypoattenuation are present which are nonspecific but likely  represent chronic microvascular ischemic changes. There is no gross loss of the  gray-white matter discrimination to suggest an acute large vascular territory  infarction. Atherosclerotic intracranial vascular calcifications are noted. No  acute intracranial hemorrhage, intra-axial mass effect or extra-axial fluid  collection is identified.    The visualized portions of the paranasal sinuses and mastoid air cells appear  patent.  A left ocular lens implant is present.                                  ECG 12 lead   ED Interpretation   Rate 63, NSR, nl axis, nl ekg                Scoring tools                                 ED Course & MDM   MDM / ED COURSE and CLINICAL IMPRESSIONS     MDM    ED Course as of 09/23/21 1521   Thu Sep 23, 2021   1031 I: dizziness x7 hours  P: labs, CT head, IVF, paged PT for vestibular consult [IONA]   1118 Pt to come to see patient [IONA]   1202 Pt ambulating well, feels like symptoms have almost completely resolved [IONA]   1255 PT came to see patient but said that because she was feeling so much better there was no reason for them to treat her.  Also since she got Ativan in the ambulance they did not think that they would be able to evaluate her properly. [IONA]   1324 Patient is attended by daughter.  Patient states that she will stay with her daughter until she feels better.  Patient and daughter both verbalized understanding and agreeable to plan. [IONA]      ED Course User Index  [IONA] Savana Cortes PA C         Clinical Impressions as of 09/23/21 1521   Vertigo            Savana Cortes PA C  09/23/21 1521

## 2021-09-24 LAB
ATRIAL RATE: 63
P AXIS: 22
PR INTERVAL: 140
QRS DURATION: 84
QT INTERVAL: 462
QTC CALCULATION(BAZETT): 472
R AXIS: 9
T WAVE AXIS: 40
VENTRICULAR RATE: 63

## 2021-09-24 NOTE — ED ATTESTATION NOTE
ED ATTENDING DONOVAN NOTE  9/23/2021, 11:24 AM    I supervised care provided by the PA (Sophia).  We have discussed the case. I have reviewed their note and agree with the plan of treatment. I personally interviewed the patient and examined the patient.I have personally seen and examined the patient.  I reviewed and agree with their assessment and plan of care.    My examination, assessment, and plan of care of Agnieszka Burton is as follows:  The patient presents with dizziness that began this morning around 3 AM while rolling over in bed.  The sensation of the room spinning.  She believes that her symptoms are possibly related to pain water in her ear during a hot tub therapy session a month ago. No f/c, CP/SOB, vomiting, numbness/weakness, difficulty with speech headache.  She does admit to nausea    Vital Signs Review: Vital signs have been reviewed. The initial oxygen saturation is SpO2: 94 % on room air. Interpretation: borderline  ED Vitals    Date/Time Temp Pulse Resp BP SpO2 [facility]   09/23/21 1019 36.4 °C (97.6 °F) 65 16 151/72 94 % [facility]        PHYSICAL EXAM  Constitutional:    Comments: Pt. reports feeling improved after receiving Mecilizine in the ED.   HENT:   Head: Normocephalic and atraumatic.   Cardiovascular:   Rate and Rhythm: Normal rate and regular rhythm.   Heart sounds: Normal heart sounds.   Pulmonary:   Effort: Pulmonary effort is normal. No respiratory distress.   Breath sounds: Normal breath sounds.   Abdominal:   Palpation: Abdomen is soft.   Tenderness: There is no abdominal tenderness  Neurological:   Mental Status: Alert and oriented to person, place, and time. Mental status is at baseline. No focal weakness.    Impression/Plan: Dizziness/vertigo likely peripheral. CBC,CMP,UA, CT head, EKG, IVF, Meclizine PO. Reevaluate.     I was physically present for the key/critical portions of the following procedures: None      *This document was created using dragon dictation software.  There  might be some typographical errors due to this technology.     Gudelia Walker MD  09/24/21 [id number]

## 2021-10-09 LAB
25(OH)D3+25(OH)D2 SERPL-MCNC: 33.8 NG/ML (ref 30–100)
ALBUMIN SERPL-MCNC: 4.2 G/DL (ref 3.7–4.7)
ALBUMIN/GLOB SERPL: 2.5 {RATIO} (ref 1.2–2.2)
ALP SERPL-CCNC: 89 IU/L (ref 44–121)
ALT SERPL-CCNC: 12 IU/L (ref 0–32)
AST SERPL-CCNC: 15 IU/L (ref 0–40)
BILIRUB SERPL-MCNC: 0.3 MG/DL (ref 0–1.2)
BUN SERPL-MCNC: 16 MG/DL (ref 8–27)
BUN/CREAT SERPL: 23 (ref 12–28)
CALCIUM SERPL-MCNC: 9.3 MG/DL (ref 8.7–10.3)
CHLORIDE SERPL-SCNC: 104 MMOL/L (ref 96–106)
CO2 SERPL-SCNC: 26 MMOL/L (ref 20–29)
CREAT SERPL-MCNC: 0.7 MG/DL (ref 0.57–1)
GLOBULIN SER CALC-MCNC: 1.7 G/DL (ref 1.5–4.5)
GLUCOSE SERPL-MCNC: 89 MG/DL (ref 65–99)
LAB CORP EGFR IF AFRICN AM: 99 ML/MIN/1.73
LAB CORP EGFR IF NONAFRICN AM: 86 ML/MIN/1.73
PHOSPHATE SERPL-MCNC: 4 MG/DL (ref 3–4.3)
POTASSIUM SERPL-SCNC: 4 MMOL/L (ref 3.5–5.2)
PROT SERPL-MCNC: 5.9 G/DL (ref 6–8.5)
PTH-INTACT SERPL-MCNC: 36 PG/ML (ref 15–65)
SODIUM SERPL-SCNC: 142 MMOL/L (ref 134–144)
T4 FREE SERPL-MCNC: 1.15 NG/DL (ref 0.82–1.77)
TSH SERPL DL<=0.005 MIU/L-ACNC: 2.04 UIU/ML (ref 0.45–4.5)

## 2021-11-18 ENCOUNTER — OFFICE VISIT (OUTPATIENT)
Dept: ENDOCRINOLOGY | Facility: CLINIC | Age: 76
End: 2021-11-18
Payer: MEDICARE

## 2021-11-18 VITALS — HEIGHT: 64 IN | HEART RATE: 83 BPM | BODY MASS INDEX: 26.46 KG/M2 | WEIGHT: 155 LBS

## 2021-11-18 DIAGNOSIS — E89.0 POST-SURGICAL HYPOTHYROIDISM: Primary | ICD-10-CM

## 2021-11-18 DIAGNOSIS — M85.80 OSTEOPENIA, UNSPECIFIED LOCATION: ICD-10-CM

## 2021-11-18 DIAGNOSIS — E55.9 VITAMIN D DEFICIENCY: ICD-10-CM

## 2021-11-18 PROCEDURE — 99214 OFFICE O/P EST MOD 30 MIN: CPT | Performed by: INTERNAL MEDICINE

## 2021-11-18 RX ORDER — MOMETASONE FUROATE MONOHYDRATE 50 UG/1
2 SPRAY, METERED NASAL DAILY
COMMUNITY

## 2021-11-18 RX ORDER — LEVOTHYROXINE SODIUM 88 UG/1
88 TABLET ORAL
Qty: 90 TABLET | Refills: 3 | Status: SHIPPED | OUTPATIENT
Start: 2021-11-18 | End: 2022-12-07

## 2021-11-18 NOTE — PROGRESS NOTES
ENDOCRINOLOGY NOTE       Subjective    HPI:  Agnieszka Burton is a 75 y.o. female who presents for follow up for thyroid disease and osteopenia    Last visit: 5/11/21  She has an episode of vertigo in September, she went to the ED. She is going through vestibular PT. She is following with Dr. Janice Morrison.    She continues Synthroid (brand) 88 mcg daily, takes it on empty stomach in the morning, eats 1 to 2 hrs later.     Right breast lumpectomy(partial mastectomy) 11/16/20.   Left breast lumpectomy 12/22/20  Left re-excision 1/11/2021  Left breast mastectomy with reconstruction (flap) 3/30/2021    No chemo or radiation. She was started on anastrozole on 4/30/3021, she follows with Dr. Anshul Romano in oncology.     She lost 10 lbs in the fall, but she regained it.         Supplements:  - vitamin D3 4000 IU daily (she increased her dose in December)  - centrum for women 50 plus (has been taking it in the morning),she has not been taking it since the fall of 2020.   - vitamin C  - vitamin E     Diet:  - lactaid with her coffee (2 cups of coffee per day)  - yogurt on and off  - cheese daily     Exercise:   - aiming for walking 2.2 miles per day  - gardening  - swimming last summer     Social History:    She stopped smoking completely in February of 2017.  She is .  She has one son and one daughter.  She stopped working January of 2018. 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  She sold her house, she is now staying with her daughter, looking for a place.     Medications:    Current Outpatient Medications:   •  anastrozole (ARIMIDEX) 1 mg tablet, , Disp: , Rfl:   •  ascorbic acid, vitamin C, (VITAMIN C) 500 mg CR capsule, 500 mg., Disp: , Rfl:   •  cholecalciferol, vitamin D3, (VITAMIN D3) 2,000 unit tablet, take 1 by oral route  every day, Disp: , Rfl:   •  fexofenadine-pseudoephedrine (ALLEGRA-D 24 HOUR) 180-240 mg per 24 hr  tablet, take 1 tablet by oral route  every day on an empty stomach with a glass of water, Disp: , Rfl:   •  mometasone 50 mcg/actuation nasal spray, Administer 2 sprays into each nostril daily., Disp: , Rfl:   •  rosuvastatin (CRESTOR) 10 mg tablet, 10 mg., Disp: , Rfl:   •  SYNTHROID 88 mcg tablet, Take 1 tablet (88 mcg total) by mouth once daily., Disp: 90 tablet, Rfl: 3  •  vitamin E 400 unit capsule, take 2 pills daily, Disp: , Rfl:      Allergies:  Iodinated contrast media, Latex, Penicillin, and Sulfa (sulfonamide antibiotics)     Medical History:  Past Medical History:   Diagnosis Date   • Acid reflux    • Breast cancer (CMS/HCC)    • Disease of thyroid gland    • Hx of tonsillectomy    • Lipid disorder        Surgical History:   Past Surgical History:   Procedure Laterality Date   • APPENDECTOMY     • HYSTERECTOMY     • MASTECTOMY Left    • NEPHROPEXY     • THYROIDECTOMY         Family History:  Family History   Problem Relation Age of Onset   • Coronary artery disease Biological Mother    • Osteoporosis Biological Mother    • COPD Biological Father    • Heart attack Biological Father        Social history:  Social History     Socioeconomic History   • Marital status: Single     Spouse name: Not on file   • Number of children: Not on file   • Years of education: Not on file   • Highest education level: Not on file   Occupational History   • Not on file   Tobacco Use   • Smoking status: Former Smoker     Quit date:      Years since quittin.9   • Smokeless tobacco: Never Used   Substance and Sexual Activity   • Alcohol use: Yes     Comment: social    • Drug use: No   • Sexual activity: Not on file   Other Topics Concern   • Not on file   Social History Narrative   • Not on file     Social Determinants of Health     Financial Resource Strain:    • Difficulty of Paying Living Expenses: Not on file   Food Insecurity: No Food Insecurity   • Worried About Running Out of Food in the Last Year: Never true  "  • Ran Out of Food in the Last Year: Never true   Transportation Needs:    • Lack of Transportation (Medical): Not on file   • Lack of Transportation (Non-Medical): Not on file   Physical Activity:    • Days of Exercise per Week: Not on file   • Minutes of Exercise per Session: Not on file   Stress:    • Feeling of Stress : Not on file   Social Connections:    • Frequency of Communication with Friends and Family: Not on file   • Frequency of Social Gatherings with Friends and Family: Not on file   • Attends Advent Services: Not on file   • Active Member of Clubs or Organizations: Not on file   • Attends Club or Organization Meetings: Not on file   • Marital Status: Not on file   Intimate Partner Violence:    • Fear of Current or Ex-Partner: Not on file   • Emotionally Abused: Not on file   • Physically Abused: Not on file   • Sexually Abused: Not on file   Housing Stability:    • Unable to Pay for Housing in the Last Year: Not on file   • Number of Places Lived in the Last Year: Not on file   • Unstable Housing in the Last Year: Not on file       Review of Systems  All other systems reviewed and negative except as noted in HPI    Objective   Vitals:    11/18/21 1353   Pulse: 83   Weight: 70.3 kg (155 lb)   Height: 1.613 m (5' 3.5\")     Body mass index is 27.03 kg/m².    Physical Exam   Constitutional: She appears well-developed.   HENT:   Head: Normocephalic and atraumatic.   Eyes: Pupils are equal, round, and reactive to light. Conjunctivae and EOM are normal.   Neck: No tracheal deviation present. No thyromegaly present.   Neck surgical scars well-healed   Cardiovascular: Normal rate, regular rhythm and normal heart sounds.    Pulmonary/Chest: Effort normal.   Musculoskeletal: She exhibits no edema.   Lymphadenopathy:     She has no cervical adenopathy.   Neurological: She displays normal reflexes.   Skin: No rash noted.   Psychiatric: She has a normal mood and affect.    Lab Results   Component Value Date    " TSH 2.040 10/08/2021    TSH 0.992 09/21/2020    FREET4 0.78 06/08/2018    ALT 12 10/08/2021        Lab Results   Component Value Date    CREATININE 0.70 10/08/2021    K 4.0 10/08/2021     Lab Results   Component Value Date    TSH 2.040 10/08/2021       DEXA 10/4/18 (Compared to 8/22/16)  T-score  L spine -0.9  Total hip -1.7  Femoral neck -2  Stable at the hip  Increased 2/7 % at the spine      [facility], [facility] Inrbarbaraults - 10/13/2020  5:09 PM EDT  BONE DENSITOMETRY    Clinical Indication: Menopause.    Comparison date: 10/4/2018    Bone densitometry of the AP lumbar spine and left hip was performed using a [place] bone densitometer. The patient is well positioned and the regions of interest are properly placed.    AP spine L1-L4       BMD:  1.093 g/cm2     T-score:  -0.8,   normal            Total hip                   BMD:  0.729 g/cm2     T-score:  -2.2,   osteopenia     Femoral neck          BMD:  0.764 g/cm2     T-score:  -2.0,   osteopenia      There is a decrease of 8.3% in the bone density of the hip and no statistically significant change in the bone density of the lumbar spine.    A FRAX score was also calculated on this patient since the bone density falls within the osteopenic range. Risk factors which were included in this calculation are as follows:    Family history of parent with hip fracture.    The 10-year probability of a major osteoporotic fracture is 23.2%. The 10-year probability of a hip fracture is 11.3%.      Assessment/Plan    Surgical Hypothyroidism    She had total thyroidectomy for a large symptomatic goiter in 2009 by Dr. Frost. The final surgical pathology was benign.  She had temporary left vocal cord paralysis after the surgery, but she recovered.      She is on treatment with Anastrozole.   Continue to monitor her thyroid levels.        Osteopenia    She stopped smoking in 2017.  She did not tolerate Atelvia in 2017 because of significant generalized joint pain.  She did not tolerate  calcium supplements because of abdominal cramps  .  - increase vitamin D3, to 5000 IU daily  - continue adequate calcium intake  - regular exercise  - follow up with her oncologist for her bone density while on treatment with Anastrozole.       Acid Reflux    Her symptoms are controlled on treatment with Nexium.  It is not interfering with her thyroid levels.          I spent 25 minutes on this date of service performing the following activities: obtaining history, performing examination, entering orders, documenting, preparing for visit, obtaining / reviewing records, providing counseling and education, independently reviewing study/studies and coordinating care.    Patient Instructions   - continue same dose Synthroid (brand) 88 mcg daily    - increase your vitamin D3 to 5000 IU, one softegel daily    - follow up in one year, get blood test done before you see me.     - continue follow up with Dr. Romano for your bone density        Aicha Cota MD  11/18/2021

## 2021-11-18 NOTE — PATIENT INSTRUCTIONS
- continue same dose Synthroid (brand) 88 mcg daily    - increase your vitamin D3 to 5000 IU, one softegel daily    - follow up in one year, get blood test done before you see me.     - continue follow up with Dr. Romano for your bone density

## 2022-10-15 LAB
25(OH)D3+25(OH)D2 SERPL-MCNC: 42.4 NG/ML (ref 30–100)
ALBUMIN SERPL-MCNC: 4.2 G/DL (ref 3.7–4.7)
ALBUMIN/GLOB SERPL: 2.3 {RATIO} (ref 1.2–2.2)
ALP SERPL-CCNC: 98 IU/L (ref 44–121)
ALT SERPL-CCNC: 19 IU/L (ref 0–32)
AST SERPL-CCNC: 17 IU/L (ref 0–40)
BILIRUB SERPL-MCNC: 0.5 MG/DL (ref 0–1.2)
BUN SERPL-MCNC: 16 MG/DL (ref 8–27)
BUN/CREAT SERPL: 27 (ref 12–28)
CALCIUM SERPL-MCNC: 9.3 MG/DL (ref 8.7–10.3)
CHLORIDE SERPL-SCNC: 103 MMOL/L (ref 96–106)
CO2 SERPL-SCNC: 25 MMOL/L (ref 20–29)
CREAT SERPL-MCNC: 0.59 MG/DL (ref 0.57–1)
EGFRCR SERPLBLD CKD-EPI 2021: 94 ML/MIN/1.73
GLOBULIN SER CALC-MCNC: 1.8 G/DL (ref 1.5–4.5)
GLUCOSE SERPL-MCNC: 94 MG/DL (ref 70–99)
POTASSIUM SERPL-SCNC: 3.9 MMOL/L (ref 3.5–5.2)
PROT SERPL-MCNC: 6 G/DL (ref 6–8.5)
SODIUM SERPL-SCNC: 142 MMOL/L (ref 134–144)
T4 FREE SERPL-MCNC: 1.34 NG/DL (ref 0.82–1.77)
TSH SERPL DL<=0.005 MIU/L-ACNC: 0.66 UIU/ML (ref 0.45–4.5)

## 2022-10-16 LAB — PTH-INTACT SERPL-MCNC: 52 PG/ML (ref 15–65)

## 2022-12-07 ENCOUNTER — EMERGENCY (EMERGENCY)
Facility: HOSPITAL | Age: 77
LOS: 1 days | Discharge: HOME | End: 2022-12-07
Attending: STUDENT IN AN ORGANIZED HEALTH CARE EDUCATION/TRAINING PROGRAM | Admitting: STUDENT IN AN ORGANIZED HEALTH CARE EDUCATION/TRAINING PROGRAM
Payer: MEDICARE

## 2022-12-07 VITALS
TEMPERATURE: 98 F | SYSTOLIC BLOOD PRESSURE: 200 MMHG | HEIGHT: 63 IN | RESPIRATION RATE: 16 BRPM | DIASTOLIC BLOOD PRESSURE: 97 MMHG | WEIGHT: 149.91 LBS | HEART RATE: 72 BPM | OXYGEN SATURATION: 96 %

## 2022-12-07 VITALS
SYSTOLIC BLOOD PRESSURE: 200 MMHG | RESPIRATION RATE: 18 BRPM | DIASTOLIC BLOOD PRESSURE: 83 MMHG | HEART RATE: 66 BPM | OXYGEN SATURATION: 98 %

## 2022-12-07 DIAGNOSIS — S01.511A LACERATION WITHOUT FOREIGN BODY OF LIP, INITIAL ENCOUNTER: ICD-10-CM

## 2022-12-07 DIAGNOSIS — Z88.0 ALLERGY STATUS TO PENICILLIN: ICD-10-CM

## 2022-12-07 DIAGNOSIS — K08.89 OTHER SPECIFIED DISORDERS OF TEETH AND SUPPORTING STRUCTURES: ICD-10-CM

## 2022-12-07 DIAGNOSIS — I10 ESSENTIAL (PRIMARY) HYPERTENSION: ICD-10-CM
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
## 2022-12-07 DIAGNOSIS — M25.512 PAIN IN LEFT SHOULDER: ICD-10-CM

## 2022-12-07 DIAGNOSIS — Z91.041 RADIOGRAPHIC DYE ALLERGY STATUS: ICD-10-CM

## 2022-12-07 DIAGNOSIS — Z88.2 ALLERGY STATUS TO SULFONAMIDES: ICD-10-CM

## 2022-12-07 DIAGNOSIS — S80.212A ABRASION, LEFT KNEE, INITIAL ENCOUNTER: ICD-10-CM
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
## 2022-12-07 PROCEDURE — 99284 EMERGENCY DEPT VISIT MOD MDM: CPT | Mod: 25

## 2022-12-07 PROCEDURE — 70486 CT MAXILLOFACIAL W/O DYE: CPT | Mod: MA

## 2022-12-07 PROCEDURE — 70486 CT MAXILLOFACIAL W/O DYE: CPT | Mod: 26,MA

## 2022-12-07 PROCEDURE — 72125 CT NECK SPINE W/O DYE: CPT | Mod: MA

## 2022-12-07 PROCEDURE — 70450 CT HEAD/BRAIN W/O DYE: CPT | Mod: 26,MA

## 2022-12-07 PROCEDURE — 73030 X-RAY EXAM OF SHOULDER: CPT | Mod: 26,LT

## 2022-12-07 PROCEDURE — 12011 RPR F/E/E/N/L/M 2.5 CM/<: CPT

## 2022-12-07 PROCEDURE — 73030 X-RAY EXAM OF SHOULDER: CPT

## 2022-12-07 PROCEDURE — 72125 CT NECK SPINE W/O DYE: CPT | Mod: 26,MA

## 2022-12-07 PROCEDURE — 70450 CT HEAD/BRAIN W/O DYE: CPT | Mod: MA

## 2022-12-07 RX ORDER — AMLODIPINE BESYLATE 2.5 MG/1
5 TABLET ORAL ONCE
Refills: 0 | Status: COMPLETED | OUTPATIENT
Start: 2022-12-07 | End: 2022-12-07

## 2022-12-07 RX ORDER — LIDOCAINE HCL 20 MG/ML
3 VIAL (ML) INJECTION ONCE
Refills: 0 | Status: COMPLETED | OUTPATIENT
Start: 2022-12-07 | End: 2022-12-07

## 2022-12-07 RX ORDER — BACITRACIN ZINC 500 UNIT/G
1 OINTMENT IN PACKET (EA) TOPICAL ONCE
Refills: 0 | Status: COMPLETED | OUTPATIENT
Start: 2022-12-07 | End: 2022-12-07

## 2022-12-07 RX ORDER — ACETAMINOPHEN 500 MG
975 TABLET ORAL ONCE
Refills: 0 | Status: COMPLETED | OUTPATIENT
Start: 2022-12-07 | End: 2022-12-07

## 2022-12-07 RX ORDER — LEVOTHYROXINE SODIUM 88 UG/1
TABLET ORAL
Qty: 90 TABLET | Refills: 1 | Status: SHIPPED | OUTPATIENT
Start: 2022-12-07 | End: 2023-02-20

## 2022-12-07 RX ADMIN — Medication 3 MILLILITER(S): at 20:39

## 2022-12-07 RX ADMIN — Medication 1 APPLICATION(S): at 20:39

## 2022-12-07 RX ADMIN — Medication 975 MILLIGRAM(S): at 19:24

## 2022-12-07 RX ADMIN — AMLODIPINE BESYLATE 5 MILLIGRAM(S): 2.5 TABLET ORAL at 20:37

## 2022-12-07 NOTE — ED PROVIDER NOTE - PATIENT PORTAL LINK FT
You can access the FollowMyHealth Patient Portal offered by [facility] by registering at the following website: [email or web address]. By joining [facility]’s FollowMyHealth portal, you will also be able to view your health information using other applications (apps) compatible with our system.

## 2022-12-07 NOTE — ED PROVIDER NOTE - NSFOLLOWUPINSTRUCTIONS_ED_ALL_ED_FT
Follow up with your primary medical doctor as soon as possible.    Return to the emergency department if your symptoms worsen or if you develop new symptoms.    Laceration    A laceration is a cut that goes through all of the layers of the skin and into the tissue that is right under the skin. Some lacerations heal on their own. Others need to be closed with skin adhesive strips, skin glue, stitches (sutures), or staples. Proper laceration care minimizes the risk of infection and helps the laceration to heal better.  If non-absorbable stitches or staples have been placed, they must be taken out within the time frame instructed by your healthcare provider.    SEEK IMMEDIATE MEDICAL CARE IF YOU HAVE ANY OF THE FOLLOWING SYMPTOMS: swelling around the wound, worsening pain, drainage from the wound, red streaking going away from your wound, inability to move finger or toe near the laceration, or discoloration of skin near the laceration.       Fall Prevention in the Home, Adult      Falls can cause injuries and can affect people from all age groups. There are many simple things that you can do to make your home safe and to help prevent falls. Ask for help when making these changes, if needed.      What actions can I take to prevent falls?    General instructions     •Use good lighting in all rooms. Replace any light bulbs that burn out.      •Turn on lights if it is dark. Use night-lights.      •Place frequently used items in easy-to-reach places. Lower the shelves around your home if necessary.      •Set up furniture so that there are clear paths around it. Avoid moving your furniture around.      •Remove throw rugs and other tripping hazards from the floor.      •Avoid walking on wet floors.      •Fix any uneven floor surfaces.      •Add color or contrast paint or tape to grab bars and handrails in your home. Place contrasting color strips on the first and last steps of stairways.      •When you use a stepladder, make sure that it is completely opened and that the sides are firmly locked. Have someone hold the ladder while you are using it. Do not climb a closed stepladder.      •Be aware of any and all pets.        What can I do in the bathroom?                   •Keep the floor dry. Immediately clean up any water that spills onto the floor.      •Remove soap buildup in the tub or shower on a regular basis.      •Use non-skid mats or decals on the floor of the tub or shower.      •Attach bath mats securely with double-sided, non-slip rug tape.      •If you need to sit down while you are in the shower, use a plastic, non-slip stool.      •Install grab bars by the toilet and in the tub and shower. Do not use towel bars as grab bars.      What can I do in the bedroom?     •Make sure that a bedside light is easy to reach.      • Do not use oversized bedding that drapes onto the floor.      •Have a firm chair that has side arms to use for getting dressed.      What can I do in the kitchen?     •Clean up any spills right away.      •If you need to reach for something above you, use a sturdy step stool that has a grab bar.      •Keep electrical cables out of the way.      • Do not use floor polish or wax that makes floors slippery. If you must use wax, make sure that it is non-skid floor wax.      What can I do in the stairways?     • Do not leave any items on the stairs.      •Make sure that you have a light switch at the top of the stairs and the bottom of the stairs. Have them installed if you do not have them.      •Make sure that there are handrails on both sides of the stairs. Fix handrails that are broken or loose. Make sure that handrails are as long as the stairways.      •Install non-slip stair treads on all stairs in your home.      •Avoid having throw rugs at the top or bottom of stairways, or secure the rugs with carpet tape to prevent them from moving.      •Choose a carpet design that does not hide the edge of steps on the stairway.      •Check any carpeting to make sure that it is firmly attached to the stairs. Fix any carpet that is loose or worn.      What can I do on the outside of my home?     •Use bright outdoor lighting.      •Regularly repair the edges of walkways and driveways and fix any cracks.      •Remove high doorway thresholds.      •Trim any shrubbery on the main path into your home.      •Regularly check that handrails are securely fastened and in good repair. Both sides of any steps should have handrails.      •Install guardrails along the edges of any raised decks or porches.      •Clear walkways of debris and clutter, including tools and rocks.      •Have leaves, snow, and ice cleared regularly.      •Use sand or salt on walkways during winter months.      •In the garage, clean up any spills right away, including grease or oil spills.      What other actions can I take?     •Wear closed-toe shoes that fit well and support your feet. Wear shoes that have rubber soles or low heels.      •Use mobility aids as needed, such as canes, walkers, scooters, and crutches.      •Review your medicines with your health care provider. Some medicines can cause dizziness or changes in blood pressure, which increase your risk of falling.      Talk with your health care provider about other ways that you can decrease your risk of falls. This may include working with a physical therapist or  to improve your strength, balance, and endurance.      Where to find more information    •Centers for Disease Control and Prevention, [place]: https://www.cdc.gov      •National [place] on Aging: https://yo5xfar.braden.nih.gov        Contact a health care provider if:    •You are afraid of falling at home.      •You feel weak, drowsy, or dizzy at home.      •You fall at home.        Summary    •There are many simple things that you can do to make your home safe and to help prevent falls.      •Ways to make your home safe include removing tripping hazards and installing grab bars in the bathroom.      •Ask for help when making these changes in your home.      This information is not intended to replace advice given to you by your health care provider. Make sure you discuss any questions you have with your health care provider.

## 2022-12-07 NOTE — ED PROVIDER NOTE - PHYSICAL EXAMINATION
CONSTITUTIONAL: Non-toxic; in no apparent distress  HEAD: Normocephalic; atraumatic  EYES: PERRL; EOM intact   ENMT: External appears normal, + 1cm linear laceration of oral mucosa of upper lip, no vermillion boarder involvement, normal lip ROM, no fb  NECK: Supple; non-tender  CARD: Normal S1, S2; no murmurs, rubs, or gallops  RESP: Normal chest excursion with respiration; breath sounds clear and equal bilaterally  ABD: Soft, non-distended; non-tender  EXT: Slightly limited ROM of L shoulder 2/2 pain, no deformity, neurovasc intact, cap refill <2s, dps intact throughout, mild ttp of shoulder mm. Quadriceps and patellar tendons intact, no tenderness of patella, No effusion. Valgus and varus stress testing with no laxity or tenderness. Neg anterior and posterior lachmanns. No head of fibular tenderness. No tenderness of proximal or distal joint. ROM normal.   SKIN: Warm, dry, no rash, + abrasion to L knee.  NEURO: No focal neurological deficiencies, CN 2-12 intact BL, no dysmetria, no pronator drift, gait normal, strength/sensation intact throughout, normal cognition, no midline spinal ttp.

## 2022-12-07 NOTE — ED PROVIDER NOTE - OBJECTIVE STATEMENT
75 yo F w no PMH, not on AC, p/w laceration to upper lip s/p mechanical trip and fall today. No LOC. Pt tripped on sidewalk, landed on ground striking face, L shoulder, and L knee. She has an intra-oral linear laceration to her upper lip mucosa (1cm) and an abrasion to her L knee. She is walking and bearing weight on LLE without issue. She has pain and limited ROM of L shoulder, no deformity, weakness, or numbness. She states her L upper incisor feels slightly loose, will f/u with her dentist. No other complaints.

## 2022-12-07 NOTE — ED PROVIDER NOTE - CLINICAL SUMMARY MEDICAL DECISION MAKING FREE TEXT BOX
Mechanical fall w lip laceration, no LOC. Will CT head and c-spine.  Intra-oral lip laceration - will repair w absorbable sutures.  L shoulder pain and decreased ROM, no deformity - will XR to r/o bony injury.  Abrasion to L knee, pt bearing weight wo difficulty, will not image.  She states her L upper incisor feels slightly loose, will f/u with her dentist.  Analgesia

## 2022-12-07 NOTE — ED ADULT TRIAGE NOTE - CHIEF COMPLAINT QUOTE
Pt BIBEMS from street s/p mechanical trip and fall PTA w/ head strike. Pt w/ hematoma to face and abrasion to left knee. Denies LOC or any other sx. No AC use.

## 2022-12-07 NOTE — ED PROVIDER NOTE - PROGRESS NOTE DETAILS
Pt is ready for discharge and safe discharge has been established. Pt was treated for ___ and showed improvement. Will d/c with outpatient follow-up. Strict return-precautions were given and understanding was verbalized by patient.  I have discussed the discharge plan with the patient. The patient agrees with the plan, as discussed. The patient understands Emergency Department diagnosis is a preliminary diagnosis often based on limited information and that the patient must adhere to the follow-up plan as discussed. The patient understands that if the symptoms worsen or if prescribed medications do not have the desired/planned effect that the patient may return to the Emergency Department at any time for further evaluation and treatment.  Pt noted to be HTN to 200 SBP in ED, no PMH HTN. No HA, vision change, hematuria, or s/s of end organ damage. Will give dose of amlodipine and dc w PMD f/u for HTN.

## 2022-12-07 NOTE — ED ADULT NURSE NOTE - NSIMPLEMENTINTERV_GEN_ALL_ED
Implemented All Universal Safety Interventions:  [place] to call system. Call bell, personal items and telephone within reach. Instruct patient to call for assistance. Room bathroom lighting operational. Non-slip footwear when patient is off stretcher. Physically safe environment: no spills, clutter or unnecessary equipment. Stretcher in lowest position, wheels locked, appropriate side rails in place.

## 2022-12-07 NOTE — ED ADULT NURSE NOTE - OBJECTIVE STATEMENT
76y female s/p mechanical fall. states she tripped over sidewalk and laded on L side. hematoma noted to L side of face. abrasion to L knee. also rpts pain to L shoulder. Patient is awake and alert. Alert and oriented x 4. Respirations even and unlabored. speaking in clear, full sentences. no LOC. not on any blood thinners. denies f/c, CP, SOB, n/v/d, HA, numbness/tingling, blurry vision, unilateral weakness. No acute distress noted at this time.

## 2022-12-07 NOTE — ED PROVIDER NOTE - NS ED ROS FT
CONSTITUTIONAL: No fever, no chills, no fatigue  EYES: No eye redness, no visual changes  ENT: No ear pain, no sore throat, + lip laceration  CARDIOVASCULAR: No chest pain, no palpitations  RESPIRATORY: No cough, no SOB  GI: No abdominal pain, no nausea, no vomiting, no constipation, no diarrhea  GENITOURINARY: No dysuria, no frequency, no hematuria  MUSCULOSKELETAL: No back pain, + shoulder and knee pain, no myalgias  SKIN: No rash, no peripheral edema  NEURO: No headache, no confusion    ALL OTHER SYSTEMS NEGATIVE.

## 2022-12-14 ENCOUNTER — OFFICE VISIT (OUTPATIENT)
Dept: ENDOCRINOLOGY | Facility: CLINIC | Age: 77
End: 2022-12-14
Payer: MEDICARE

## 2022-12-14 VITALS
HEIGHT: 63 IN | HEART RATE: 69 BPM | SYSTOLIC BLOOD PRESSURE: 150 MMHG | BODY MASS INDEX: 27.82 KG/M2 | DIASTOLIC BLOOD PRESSURE: 78 MMHG | WEIGHT: 157 LBS

## 2022-12-14 DIAGNOSIS — E89.0 POST-SURGICAL HYPOTHYROIDISM: Primary | ICD-10-CM

## 2022-12-14 PROCEDURE — 99214 OFFICE O/P EST MOD 30 MIN: CPT | Performed by: INTERNAL MEDICINE

## 2022-12-14 RX ORDER — PANTOPRAZOLE SODIUM 40 MG/1
40 TABLET, DELAYED RELEASE ORAL DAILY
COMMUNITY
End: 2023-11-15 | Stop reason: ALTCHOICE

## 2022-12-14 RX ORDER — AMLODIPINE BESYLATE 5 MG/1
5 TABLET ORAL DAILY
COMMUNITY
End: 2023-11-15 | Stop reason: ALTCHOICE

## 2022-12-14 NOTE — LETTER
December 14, 2022     Tiera Egan MD  [address] [facility]  [address]  [place] [address]    Patient: Agnieszka Burton  YOB: 1945  Date of Visit: 12/14/2022      Dear Dr. Egan:    Thank you for referring Agnieszka Burton to me for evaluation. Below are my notes for this consultation.    If you have questions, please do not hesitate to call me. I look forward to following your patient along with you.         Sincerely,        Aicha Cota MD        CC: No Recipients  Aicha Cota MD  12/14/2022  9:51 PM  Signed       ENDOCRINOLOGY NOTE       Subjective     HPI:  Agnieszka Burton is a 76 y.o. female who presents for follow up for thyroid disease and osteopenia    Last visit:11/18/21  She continues Synthroid (brand) 88 mcg daily, takes it on empty stomach in the morning, eats 1 to 2 hrs later.     She fell in [place] on 12/7/22, she tripped over the curb, she fell face forward. No dizziness or loss of consciousness. She was taken to the ER in [place], BP was up to 200 systolic. She was then admitted to [facility], she was having blurred vision. She has a concussion. No stroke.   Her BP was high in the hospital also, she was discharged on amlodipine. She never took BP meds before.   She is planning PT for the concussion.   She is having pain going down the left arm, MRI of the spine is planned.     She had vertigo, last episode September 2021. She did vestibular therapy last year.   She gained 10 lbs over the past year  It is hard to lose weight  She took budesonide early this year, it made her hungry.       Right breast lumpectomy(partial mastectomy) 11/16/20.   Left breast lumpectomy 12/22/20  Left re-excision 1/11/2021  Left breast mastectomy with reconstruction (flap) 3/30/2021  No chemo or radiation. She was started on anastrozole on 4/30/3021, she follows with Dr. Anshul Romano in oncology. She was started on Prolia, first dose 11/1/22.      Supplements:  - vitamin D3 4000 IU daily (she  increased her dose in December)  - centrum for women 50 plus (has been taking it in the morning),she has not been taking it since the fall of 2020.   - vitamin C  - vitamin E     Diet:  - lactaid with her coffee (2 cups of coffee per day)  - yogurt on and off  - cheese daily     Exercise:   - aiming for walking 2.2 miles per day  - gardening  - swimming last summer     Social History:    She stopped smoking completely in February of 2017.  She is .  She has one son and one daughter.  She stopped working January of 2018. She worked for a [employer] company called [employer].  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  She took a real [employer] course  She sold her house, she was staying with her daughter, she moved to a new place early November    Medications:    Current Outpatient Medications:   •  amLODIPine (NORVASC) 5 mg tablet, Take 5 mg by mouth daily., Disp: , Rfl:   •  anastrozole (ARIMIDEX) 1 mg tablet, , Disp: , Rfl:   •  ascorbic acid, vitamin C, (VITAMIN C) 500 mg CR capsule, 500 mg., Disp: , Rfl:   •  cholecalciferol, vitamin D3, 50 mcg (2,000 unit) tablet, take 1 by oral route  every day, Disp: , Rfl:   •  denosumab (PROLIA SUBQ), Inject under the skin., Disp: , Rfl:   •  fexofenadine-pseudoephedrine (ALLEGRA-D 24) 180-240 mg per 24 hr tablet, take 1 tablet by oral route  every day on an empty stomach with a glass of water, Disp: , Rfl:   •  mometasone 50 mcg/actuation nasal spray, Administer 2 sprays into each nostril daily., Disp: , Rfl:   •  pantoprazole (PROTONIX) 40 mg EC tablet, Take 40 mg by mouth daily., Disp: , Rfl:   •  rosuvastatin (CRESTOR) 10 mg tablet, 10 mg., Disp: , Rfl:   •  SYNTHROID 88 mcg tablet, TAKE 1 TABLET BY MOUTH ONCE DAILY., Disp: 90 tablet, Rfl: 1  •  vitamin E 400 unit capsule, take 2 pills daily, Disp: , Rfl:      Allergies:  Iodinated contrast media, Latex, Penicillin, and Sulfa (sulfonamide antibiotics)     Medical History:  Past Medical History:   Diagnosis Date   • Acid  "reflux    • Breast cancer (CMS/HCC)    • Disease of thyroid gland    • Hx of tonsillectomy    • Lipid disorder        Surgical History:   Past Surgical History:   Procedure Laterality Date   • APPENDECTOMY     • HYSTERECTOMY     • MASTECTOMY Left    • NEPHROPEXY     • THYROIDECTOMY         Family History:  Family History   Problem Relation Age of Onset   • Coronary artery disease Biological Mother    • Osteoporosis Biological Mother    • COPD Biological Father    • Heart attack Biological Father        Social history:  Social History     Socioeconomic History   • Marital status:      Spouse name: Not on file   • Number of children: Not on file   • Years of education: Not on file   • Highest education level: Not on file   Occupational History   • Not on file   Tobacco Use   • Smoking status: Former     Types: Cigarettes     Quit date:      Years since quittin.9   • Smokeless tobacco: Never   Substance and Sexual Activity   • Alcohol use: Yes     Comment: social    • Drug use: No   • Sexual activity: Not on file   Other Topics Concern   • Not on file   Social History Narrative   • Not on file     Social Determinants of Health     Financial Resource Strain: Not on file   Food Insecurity: Not on file   Transportation Needs: Not on file   Physical Activity: Not on file   Stress: Not on file   Social Connections: Not on file   Intimate Partner Violence: Not on file   Housing Stability: Not on file       Review of Systems  All other systems reviewed and negative except as noted in HPI    Objective    Vitals:    22 1504   BP: (!) 150/78   BP Location: Right upper arm   Patient Position: Sitting   Pulse: 69   Weight: 71.2 kg (157 lb)   Height: 1.6 m (5' 3\")     Body mass index is 27.81 kg/m².    Physical Exam   Constitutional: She appears well-developed.   HENT:   Head: Normocephalic and atraumatic.   Eyes: Pupils are equal, round, and reactive to light. Conjunctivae and EOM are normal.   Neck: No " tracheal deviation present. No thyromegaly present.   Neck surgical scars well-healed   Cardiovascular: Normal rate, regular rhythm and normal heart sounds.    Pulmonary/Chest: Effort normal.   Musculoskeletal: She exhibits no edema.   Lymphadenopathy:     She has no cervical adenopathy.   Neurological: She displays normal reflexes.   Skin: No rash noted.   Psychiatric: She has a normal mood and affect.    Lab Results   Component Value Date    TSH 0.662 10/14/2022    TSH 2.040 10/08/2021    FREET4 0.78 06/08/2018    ALT 19 10/14/2022        Lab Results   Component Value Date    CREATININE 0.59 10/14/2022    K 3.9 10/14/2022     Lab Results   Component Value Date    TSH 0.662 10/14/2022       12/10/22 ([facility])  TSH 1.04    DEXA 10/4/18 (Compared to 8/22/16)  T-score  L spine -0.9  Total hip -1.7  Femoral neck -2  Stable at the hip  Increased 2/7 % at the spine      [facility], Radiant Inresults - 10/13/2020  5:09 PM EDT  BONE DENSITOMETRY    Clinical Indication: Menopause.    Comparison date: 10/4/2018    Bone densitometry of the AP lumbar spine and left hip was performed using a [employer] bone densitometer. The patient is well positioned and the regions of interest are properly placed.    AP spine L1-L4       BMD:  1.093 g/cm2     T-score:  -0.8,   normal            Total hip                   BMD:  0.729 g/cm2     T-score:  -2.2,   osteopenia     Femoral neck          BMD:  0.764 g/cm2     T-score:  -2.0,   osteopenia      There is a decrease of 8.3% in the bone density of the hip and no statistically significant change in the bone density of the lumbar spine.    A FRAX score was also calculated on this patient since the bone density falls within the osteopenic range. Risk factors which were included in this calculation are as follows:    Family history of parent with hip fracture.    The 10-year probability of a major osteoporotic fracture is 23.2%. The 10-year probability of a hip fracture is 11.3%.      Assessment/Plan      Surgical Hypothyroidism    She had total thyroidectomy for a large symptomatic goiter in 2009 by Dr. Frost. The final surgical pathology was benign.  She had temporary left vocal cord paralysis after the surgery, but she recovered.      She is on treatment with Anastrozole.   Her TSH was 0.66 in October 2022, repeat TSH was 1.04 on 12/10/22  Recheck TSH in February, if TSH is lower than 1.0, will plan to lower her dose of thyroid hormone slightly.        Osteopenia    She stopped smoking in 2017.  She did not tolerate Atelvia in 2017 because of significant generalized joint pain.  She did not tolerate calcium supplements because of abdominal cramps  .  - continue vitamin D3, 4000 IU daily  - continue adequate calcium intake  - regular exercise  - she was started on treatment with prolia November 2022, but her oncologist. She continues treatment with Anastrozole.     Acid Reflux    Her symptoms are controlled on treatment with Nexium.  It is not interfering with her thyroid levels.    Hypertension  - recently diagnosed  - her BP has improved on lower dose amlodipine  - she will continue low salt diet  - she will start monitoring her BP at home.    I spent 30 minutes on this date of service performing the following activities: obtaining history, performing examination, entering orders, documenting, preparing for visit, obtaining / reviewing records, providing counseling and education, independently reviewing study/studies and coordinating care.    Patient Instructions   - your thyroid level in October was normal, but closer to the range of a higher dose of thyroid  Repeat level in December was better.     - for now continue Synthroid 88 mcg daily  Repeat labs in February  Send a portal message OR Call about the results if you don't hear in one week:  [phone]  Prompt 4  Prompt 1  And leave a message    - get the [employer] blood pressure machine, arm (not wrist)  Think about portability.     - vitamin D level is very  good  Continue vitamin D3, 4000 IU daily    Follow up in one year with repeat labs            Aicha Cota MD  12/14/2022

## 2022-12-14 NOTE — PROGRESS NOTES
ENDOCRINOLOGY NOTE       Subjective    HPI:  Agnieszka Burton is a 76 y.o. female who presents for follow up for thyroid disease and osteopenia    Last visit:11/18/21  She continues Synthroid (brand) 88 mcg daily, takes it on empty stomach in the morning, eats 1 to 2 hrs later.     She fell [place] on 12/7/22, she tripped over the curb, she fell face forward. No dizziness or loss of consciousness. She was taken to the ER in [place], BP was up to 200 systolic. She was then admitted to [facility], she was having blurred vision. She has a concussion. No stroke.   Her BP was high in the hospital also, she was discharged on amlodipine. She never took BP meds before.   She is planning PT for the concussion.   She is having pain going down the left arm, MRI of the spine is planned.     She had vertigo, last episode September 2021. She did vestibular therapy last year.   She gained 10 lbs over the past year  It is hard to lose weight  She took budesonide early this year, it made her hungry.       Right breast lumpectomy(partial mastectomy) 11/16/20.   Left breast lumpectomy 12/22/20  Left re-excision 1/11/2021  Left breast mastectomy with reconstruction (flap) 3/30/2021  No chemo or radiation. She was started on anastrozole on 4/30/3021, she follows with Dr. Anshul Romano in oncology. She was started on Prolia, first dose 11/1/22.      Supplements:  - vitamin D3 4000 IU daily (she increased her dose in December)  - centrum for women 50 plus (has been taking it in the morning),she has not been taking it since the fall of 2020.   - vitamin C  - vitamin E     Diet:  - lactaid with her coffee (2 cups of coffee per day)  - yogurt on and off  - cheese daily     Exercise:   - aiming for walking 2.2 miles per day  - gardening  - swimming last summer     Social History:    She stopped smoking completely in February of 2017.  She is .  She has one son and one daughter.  She stopped working January of 2018. She  worked for a [employer] company called [employer].  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  She sold her house, she was staying with her daughter, she moved to a new place early November    Medications:    Current Outpatient Medications:   •  amLODIPine (NORVASC) 5 mg tablet, Take 5 mg by mouth daily., Disp: , Rfl:   •  anastrozole (ARIMIDEX) 1 mg tablet, , Disp: , Rfl:   •  ascorbic acid, vitamin C, (VITAMIN C) 500 mg CR capsule, 500 mg., Disp: , Rfl:   •  cholecalciferol, vitamin D3, 50 mcg (2,000 unit) tablet, take 1 by oral route  every day, Disp: , Rfl:   •  denosumab (PROLIA SUBQ), Inject under the skin., Disp: , Rfl:   •  fexofenadine-pseudoephedrine (ALLEGRA-D 24) 180-240 mg per 24 hr tablet, take 1 tablet by oral route  every day on an empty stomach with a glass of water, Disp: , Rfl:   •  mometasone 50 mcg/actuation nasal spray, Administer 2 sprays into each nostril daily., Disp: , Rfl:   •  pantoprazole (PROTONIX) 40 mg EC tablet, Take 40 mg by mouth daily., Disp: , Rfl:   •  rosuvastatin (CRESTOR) 10 mg tablet, 10 mg., Disp: , Rfl:   •  SYNTHROID 88 mcg tablet, TAKE 1 TABLET BY MOUTH ONCE DAILY., Disp: 90 tablet, Rfl: 1  •  vitamin E 400 unit capsule, take 2 pills daily, Disp: , Rfl:      Allergies:  Iodinated contrast media, Latex, Penicillin, and Sulfa (sulfonamide antibiotics)     Medical History:  Past Medical History:   Diagnosis Date   • Acid reflux    • Breast cancer (CMS/HCC)    • Disease of thyroid gland    • Hx of tonsillectomy    • Lipid disorder        Surgical History:   Past Surgical History:   Procedure Laterality Date   • APPENDECTOMY     • HYSTERECTOMY     • MASTECTOMY Left    • NEPHROPEXY     • THYROIDECTOMY         Family History:  Family History   Problem Relation Age of Onset   • Coronary artery disease Biological Mother    • Osteoporosis Biological Mother    • COPD Biological Father    • Heart attack Biological Father        Social history:  Social History  "    Socioeconomic History   • Marital status:      Spouse name: Not on file   • Number of children: Not on file   • Years of education: Not on file   • Highest education level: Not on file   Occupational History   • Not on file   Tobacco Use   • Smoking status: Former     Types: Cigarettes     Quit date:      Years since quittin.9   • Smokeless tobacco: Never   Substance and Sexual Activity   • Alcohol use: Yes     Comment: social    • Drug use: No   • Sexual activity: Not on file   Other Topics Concern   • Not on file   Social History Narrative   • Not on file     Social Determinants of Health     Financial Resource Strain: Not on file   Food Insecurity: Not on file   Transportation Needs: Not on file   Physical Activity: Not on file   Stress: Not on file   Social Connections: Not on file   Intimate Partner Violence: Not on file   Housing Stability: Not on file       Review of Systems  All other systems reviewed and negative except as noted in HPI    Objective   Vitals:    22 1504   BP: (!) 150/78   BP Location: Right upper arm   Patient Position: Sitting   Pulse: 69   Weight: 71.2 kg (157 lb)   Height: 1.6 m (5' 3\")     Body mass index is 27.81 kg/m².    Physical Exam   Constitutional: She appears well-developed.   HENT:   Head: Normocephalic and atraumatic.   Eyes: Pupils are equal, round, and reactive to light. Conjunctivae and EOM are normal.   Neck: No tracheal deviation present. No thyromegaly present.   Neck surgical scars well-healed   Cardiovascular: Normal rate, regular rhythm and normal heart sounds.    Pulmonary/Chest: Effort normal.   Musculoskeletal: She exhibits no edema.   Lymphadenopathy:     She has no cervical adenopathy.   Neurological: She displays normal reflexes.   Skin: No rash noted.   Psychiatric: She has a normal mood and affect.    Lab Results   Component Value Date    TSH 0.662 10/14/2022    TSH 2.040 10/08/2021    FREET4 0.78 2018    ALT 19 10/14/2022    "     Lab Results   Component Value Date    CREATININE 0.59 10/14/2022    K 3.9 10/14/2022     Lab Results   Component Value Date    TSH 0.662 10/14/2022       12/10/22 ([place])  TSH 1.04    DEXA 10/4/18 (Compared to 8/22/16)  T-score  L spine -0.9  Total hip -1.7  Femoral neck -2  Stable at the hip  Increased 2/7 % at the spine      [facility], Radiant Inresults - 10/13/2020  5:09 PM EDT  BONE DENSITOMETRY    Clinical Indication: Menopause.    Comparison date: 10/4/2018    Bone densitometry of the AP lumbar spine and left hip was performed using a [employer] bone densitometer. The patient is well positioned and the regions of interest are properly placed.    AP spine L1-L4       BMD:  1.093 g/cm2     T-score:  -0.8,   normal            Total hip                   BMD:  0.729 g/cm2     T-score:  -2.2,   osteopenia     Femoral neck          BMD:  0.764 g/cm2     T-score:  -2.0,   osteopenia      There is a decrease of 8.3% in the bone density of the hip and no statistically significant change in the bone density of the lumbar spine.    A FRAX score was also calculated on this patient since the bone density falls within the osteopenic range. Risk factors which were included in this calculation are as follows:    Family history of parent with hip fracture.    The 10-year probability of a major osteoporotic fracture is 23.2%. The 10-year probability of a hip fracture is 11.3%.      Assessment/Plan    Surgical Hypothyroidism    She had total thyroidectomy for a large symptomatic goiter in 2009 by Dr. Frost. The final surgical pathology was benign.  She had temporary left vocal cord paralysis after the surgery, but she recovered.      She is on treatment with Anastrozole.   Her TSH was 0.66 in October 2022, repeat TSH was 1.04 on 12/10/22  Recheck TSH in February, if TSH is lower than 1.0, will plan to lower her dose of thyroid hormone slightly.        Osteopenia    She stopped smoking in 2017.  She did not tolerate Atelvia in 2017  because of significant generalized joint pain.  She did not tolerate calcium supplements because of abdominal cramps  .  - continue vitamin D3, 4000 IU daily  - continue adequate calcium intake  - regular exercise  - she was started on treatment with prolia November 2022, but her oncologist. She continues treatment with Anastrozole.     Acid Reflux    Her symptoms are controlled on treatment with Nexium.  It is not interfering with her thyroid levels.    Hypertension  - recently diagnosed  - her BP has improved on lower dose amlodipine  - she will continue low salt diet  - she will start monitoring her BP at home.    I spent 30 minutes on this date of service performing the following activities: obtaining history, performing examination, entering orders, documenting, preparing for visit, obtaining / reviewing records, providing counseling and education, independently reviewing study/studies and coordinating care.    Patient Instructions   - your thyroid level in October was normal, but closer to the range of a higher dose of thyroid  Repeat level in December was better.     - for now continue Synthroid 88 mcg daily  Repeat labs in February  Send a portal message OR Call about the results if you don't hear in one week:  [phone]  Prompt 4  Prompt 1  And leave a message    - get the [employer] blood pressure machine, arm (not wrist)  Think about portability.     - vitamin D level is very good  Continue vitamin D3, 4000 IU daily    Follow up in one year with repeat labs            Aicha Cota MD  12/14/2022

## 2023-01-04 ENCOUNTER — TRANSCRIBE ORDERS (OUTPATIENT)
Dept: SCHEDULING | Age: 78
End: 2023-01-04

## 2023-01-04 DIAGNOSIS — R09.89 OTHER SPECIFIED SYMPTOMS AND SIGNS INVOLVING THE CIRCULATORY AND RESPIRATORY SYSTEMS: Primary | ICD-10-CM

## 2023-01-09 ENCOUNTER — HOSPITAL ENCOUNTER (OUTPATIENT)
Dept: CARDIOLOGY | Facility: HOSPITAL | Age: 78
Discharge: HOME | End: 2023-01-09
Attending: FAMILY MEDICINE
Payer: COMMERCIAL

## 2023-01-09 DIAGNOSIS — R09.89 OTHER SPECIFIED SYMPTOMS AND SIGNS INVOLVING THE CIRCULATORY AND RESPIRATORY SYSTEMS: ICD-10-CM

## 2023-01-09 LAB
LEFT CCA DIST DIAS: 18.5 CM/S
LEFT CCA DIST SYS: 76.2 CM/S
LEFT CCA MID DIAS: 13.3 CM/S
LEFT CCA MID SYS: 92.3 CM/S
LEFT CCA PROX DIAS: 10.7 CM/S
LEFT CCA PROX SYS: 76.7 CM/S
LEFT ICA DIST DIAS: 20.3 CM/S
LEFT ICA DIST SYS: 93.1 CM/S
LEFT ICA MID DIAS: 20.3 CM/S
LEFT ICA MID SYS: 108.9 CM/S
LEFT ICA PROX DIAS: 18.3 CM/S
LEFT ICA PROX SYS: 103 CM/S
LEFT ICA/CCA SYS: 1.43
LEFT SUBCLAVIAN SYS: 171.8 CM/S
LT ECA PROX EDV: 12.4 CM/S
LT ECA PROX PSV: 168.1 CM/S
LT VERTEBRAL PROX EDV: 16.3 CM/S
LT VERTEBRAL PROX PSV: 73.4 CM/S
RIGHT CCA DIST DIAS: 15.2 CM/S
RIGHT CCA DIST SYS: 90.8 CM/S
RIGHT CCA MID DIAS: 12.6 CM/S
RIGHT CCA MID SYS: 81.7 CM/S
RIGHT CCA PROX DIAS: 12.6 CM/S
RIGHT CCA PROX SYS: 122.1 CM/S
RIGHT ECA SYS: 156.4 CM/S
RIGHT ICA DIST DIAS: 10 CM/S
RIGHT ICA DIST SYS: 105.4 CM/S
RIGHT ICA MID DIAS: 14.3 CM/S
RIGHT ICA MID SYS: 72.9 CM/S
RIGHT ICA PROX DIAS: 19.3 CM/S
RIGHT ICA PROX SYS: 100.6 CM/S
RIGHT ICA/CCA SYS: 1.16
RIGHT SUBCLAVIAN SYS: 229.8 CM/S
RIGHT VERTEBRAL SYS: 62.8 CM/S
RT ECA PROC EDV: 10 CM/S
RT VERTEBRAL PROX EDV: 9.5 CM/S

## 2023-01-09 PROCEDURE — 93880 EXTRACRANIAL BILAT STUDY: CPT

## 2023-02-11 LAB
T4 FREE SERPL-MCNC: 1.29 NG/DL (ref 0.82–1.77)
TSH SERPL DL<=0.005 MIU/L-ACNC: 0.69 UIU/ML (ref 0.45–4.5)

## 2023-05-11 RX ORDER — LEVOTHYROXINE SODIUM 75 UG/1
75 TABLET ORAL DAILY
Qty: 90 TABLET | Refills: 1 | Status: SHIPPED | OUTPATIENT
Start: 2023-05-11 | End: 2023-08-07 | Stop reason: SDUPTHER

## 2023-08-10 RX ORDER — LEVOTHYROXINE SODIUM 75 UG/1
75 TABLET ORAL DAILY
Qty: 90 TABLET | Refills: 1 | Status: SHIPPED | OUTPATIENT
Start: 2023-08-10 | End: 2023-11-15 | Stop reason: SDUPTHER

## 2023-08-30 ENCOUNTER — TRANSCRIBE ORDERS (OUTPATIENT)
Dept: SCHEDULING | Age: 78
End: 2023-08-30

## 2023-08-30 ENCOUNTER — TELEPHONE (OUTPATIENT)
Dept: PULMONOLOGY | Facility: HOSPITAL | Age: 78
End: 2023-08-30
Payer: MEDICARE

## 2023-08-30 VITALS — BODY MASS INDEX: 26.58 KG/M2 | HEIGHT: 63 IN | WEIGHT: 150 LBS

## 2023-08-30 DIAGNOSIS — Z87.891 PERSONAL HISTORY OF TOBACCO USE, PRESENTING HAZARDS TO HEALTH: Primary | ICD-10-CM

## 2023-08-31 ENCOUNTER — HOSPITAL ENCOUNTER (OUTPATIENT)
Dept: RADIOLOGY | Age: 78
Discharge: HOME | End: 2023-08-31
Attending: FAMILY MEDICINE
Payer: MEDICARE

## 2023-08-31 DIAGNOSIS — Z87.891 PERSONAL HISTORY OF TOBACCO USE, PRESENTING HAZARDS TO HEALTH: ICD-10-CM

## 2023-08-31 PROCEDURE — 71271 CT THORAX LUNG CANCER SCR C-: CPT

## 2023-11-15 ENCOUNTER — OFFICE VISIT (OUTPATIENT)
Dept: ENDOCRINOLOGY | Facility: CLINIC | Age: 78
End: 2023-11-15
Payer: MEDICARE

## 2023-11-15 VITALS
SYSTOLIC BLOOD PRESSURE: 138 MMHG | RESPIRATION RATE: 16 BRPM | OXYGEN SATURATION: 98 % | WEIGHT: 158.4 LBS | BODY MASS INDEX: 27.04 KG/M2 | HEIGHT: 64 IN | HEART RATE: 83 BPM | DIASTOLIC BLOOD PRESSURE: 72 MMHG

## 2023-11-15 DIAGNOSIS — E55.9 VITAMIN D DEFICIENCY: ICD-10-CM

## 2023-11-15 DIAGNOSIS — E89.0 POST-SURGICAL HYPOTHYROIDISM: Primary | ICD-10-CM

## 2023-11-15 DIAGNOSIS — M85.80 OSTEOPENIA, UNSPECIFIED LOCATION: ICD-10-CM

## 2023-11-15 PROCEDURE — 99215 OFFICE O/P EST HI 40 MIN: CPT | Performed by: INTERNAL MEDICINE

## 2023-11-15 RX ORDER — LOSARTAN POTASSIUM AND HYDROCHLOROTHIAZIDE 12.5; 1 MG/1; MG/1
1 TABLET ORAL DAILY
COMMUNITY
Start: 2023-10-29

## 2023-11-15 RX ORDER — FAMOTIDINE 40 MG/1
TABLET, FILM COATED ORAL
COMMUNITY
Start: 2023-11-07 | End: 2023-11-15 | Stop reason: SDUPTHER

## 2023-11-15 RX ORDER — LEVOTHYROXINE SODIUM 75 UG/1
75 TABLET ORAL DAILY
Qty: 90 TABLET | Refills: 3 | Status: SHIPPED | OUTPATIENT
Start: 2023-11-15 | End: 2025-01-26 | Stop reason: SDUPTHER

## 2023-11-15 RX ORDER — FAMOTIDINE 40 MG/1
40 TABLET, FILM COATED ORAL 2 TIMES DAILY
COMMUNITY
Start: 2023-10-09

## 2023-11-15 NOTE — PATIENT INSTRUCTIONS
-Your thyroid level is not ideal.  You are likely not absorbing the entire amount of the thyroid pill    -For now continue Synthroid 75 mcg, every morning  Ideally wait 30 minutes before eating    -You need to separate the famotidine from the thyroid by at least an hour    -You need to separate the magnesium from the thyroid medication by at least 4 hours, so I recommend taking the magnesium lunch or later    -Other vitamins that would need to be  from the thyroid medication by at least 4 hours are:  Calcium pill, magnesium Pill, multivitamin, iron pill    -If you are not sure and you are starting an over-the-counter supplement or vitamin you can send me a message    -Get your blood test done again in January around 8 weeks from today  Send a portal message OR Call about the results if you don't hear in one week:  [phone]  Prompt 4  Prompt 1  And leave a message    -Talk to Dr. Egan about your symptoms of depression and anxiety  You may need treatment for the short-term until your body recovers from the concussion

## 2023-11-15 NOTE — PROGRESS NOTES
ENDOCRINOLOGY NOTE       Subjective    HPI:  Agnieszka Burton is a 77 y.o. female who presents for follow up for thyroid disease and osteopenia    Last visit: 12/14/22  Her TSH was 0.695 on 2/10/23, I lowered her dose to Synthroid 75 mcg daily, repeat TSH on 5/2/23 was 1.37.   She takes the Synthroid (NB) 75 mcg every morning, on empty stomach. She eats at least 30 minutes later.   She started magnesium around a month ago for leg cramps. She is taking it with the Synthroid.     She has been in PT since she fell on 12/7/22. She had a concussion. She had pain down the left arm. She also had difficulty with the left leg. She had steroid injection in the left knee. She is following with ortho.    MRI of the spine December 2022 did not show any fractures.   The PT helped the left arm and the left leg.   She hurts everywhere. Her arms hurt. She is depressed. She is having bouts of anxiety  She is gaining weight.   She is tired.     Her BP has been elevated thought to be related to anastrozole. She is following with cardiology.   No more issues with vertigo      Right breast lumpectomy(partial mastectomy) 11/16/20.   Left breast lumpectomy 12/22/20  Left re-excision 1/11/2021  Left breast mastectomy with reconstruction (flap) 3/30/2021  No chemo or radiation. She was started on anastrozole on 4/30/3021, she follows with oncology at [facility]. She was started on Prolia, first dose 11/1/22.      Supplements:  - vitamin D3 4000 IU daily (she increased her dose in December)  - magnesium  - vitamin C  - vitamin E     Diet:  - lactaid with her coffee (2 cups of coffee per day)  - yogurt on and off  - cheese daily     Exercise:   - aiming for walking 2.2 miles per day  - gardening  - swimming last summer     Social History:    She stopped smoking completely in February of 2017.  She is .  She has one son and one daughter.  She stopped working January of 2018. She worked for a [employer] company called [employer].  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.  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 No dizziness or loss of consciousness. She was taken to the ER in [place], BP was up to 200 systolic. She was then admitted to [facility], she was having blurred vision. She has a concussion. No stroke.   Her BP was high in the hospital also, she was discharged on amlodipine. She never took BP meds before.   She is planning PT for the concussion.   She is having pain going down the left arm, MRI of the spine is planned.       Medications:    Current Outpatient Medications:     anastrozole (ARIMIDEX) 1 mg tablet, , Disp: , Rfl:     ascorbic acid, vitamin C, (VITAMIN C) 500 mg CR capsule, 500 mg., Disp: , Rfl:     cholecalciferol, vitamin D3, 50 mcg (2,000 unit) tablet, take 1 by oral route  every day, Disp: , Rfl:     denosumab (PROLIA SUBQ), Inject under the skin., Disp: , Rfl:     famotidine (PEPCID) 40 mg tablet, Take 40 mg by mouth 2 times daily., Disp: , Rfl:     fexofenadine-pseudoephedrine (ALLEGRA-D 24) 180-240 mg per 24 hr tablet, take 1 tablet by oral route  every day on an empty stomach with a glass of water, Disp: , Rfl:     mometasone 50 mcg/actuation nasal spray, Administer 2 sprays into each nostril daily., Disp: , Rfl:     rosuvastatin (CRESTOR) 20 mg tablet, 10 mg., Disp: , Rfl:     SYNTHROID 75 mcg tablet, Take 1 tablet (75 mcg total) by mouth daily., Disp: 90 tablet, Rfl: 3    vitamin E 400 unit capsule, take 2 pills daily, Disp: , Rfl:     losartan-hydrochlorothiazide (HYZAAR) 100-12.5 mg per tablet, Take 1 tablet by mouth daily., Disp: , Rfl:      Allergies:  Iodinated contrast media, Latex, Penicillin, and Sulfa (sulfonamide antibiotics)     Medical History:  Past Medical History:   Diagnosis Date    Acid  reflux     Breast cancer (CMS/HCC)     Cancer (CMS/HCC)     breast cancer, mastectomy 3/2021    Disease of thyroid gland     Hx of tonsillectomy     Lipid disorder        Surgical History:   Past Surgical History:   Procedure Laterality Date    APPENDECTOMY      HYSTERECTOMY      MASTECTOMY Left     NEPHROPEXY      THYROIDECTOMY         Family History:  Family History   Problem Relation Age of Onset    Coronary artery disease Biological Mother     Osteoporosis Biological Mother     COPD Biological Father     Heart attack Biological Father        Social history:  Social History     Socioeconomic History    Marital status:      Spouse name: Not on file    Number of children: Not on file    Years of education: Not on file    Highest education level: Not on file   Occupational History    Not on file   Tobacco Use    Smoking status: Former     Packs/day: 1.00     Years: 25.00     Additional pack years: 0.00     Total pack years: 25.00     Types: Cigarettes     Start date:      Quit date:      Years since quittin.8     Passive exposure: Past    Smokeless tobacco: Never    Tobacco comments:     Quit for about 25 yrs during span, max was a pack a day but tried to use less   Substance and Sexual Activity    Alcohol use: Yes     Comment: social     Drug use: No    Sexual activity: Not on file   Other Topics Concern    Not on file   Social History Narrative    Not on file     Social Determinants of Health     Financial Resource Strain: Not on file   Food Insecurity: No Food Insecurity (2021)    Hunger Vital Sign     Worried About Running Out of Food in the Last Year: Never true     Ran Out of Food in the Last Year: Never true   Transportation Needs: Not on file   Physical Activity: Not on file   Stress: Not on file   Social Connections: Not on file   Intimate Partner Violence: Not on file   Housing Stability: Not on file       Review of Systems  All other systems reviewed  "and negative except as noted in HPI    Objective   Vitals:    11/15/23 1246   BP: 138/72   BP Location: Left upper arm   Patient Position: Sitting   Pulse: 83   Resp: 16   SpO2: 98%   Weight: 71.8 kg (158 lb 6.4 oz)   Height: 1.626 m (5' 4\")     Body mass index is 27.19 kg/m².    Physical Exam   Constitutional: She appears well-developed.   HENT:   Head: Normocephalic and atraumatic.   Eyes: Pupils are equal, round, and reactive to light. Conjunctivae and EOM are normal.   Neck: No tracheal deviation present. No thyromegaly present.   Neck surgical scars well-healed   Cardiovascular: Normal rate, regular rhythm and normal heart sounds.    Pulmonary/Chest: Effort normal.   Musculoskeletal: She exhibits no edema.   Lymphadenopathy:     She has no cervical adenopathy.   Neurological: She displays normal reflexes.   Skin: No rash noted.   Psychiatric: She has a normal mood and affect.    Lab Results   Component Value Date    TSH 5.910 (H) 11/08/2023    TSH 1.370 05/02/2023    FREET4 0.78 06/08/2018    ALT 19 11/08/2023        Lab Results   Component Value Date    CREATININE 0.81 11/08/2023    K 4.1 11/08/2023     Lab Results   Component Value Date    TSH 5.910 (H) 11/08/2023    FREET4 0.78 06/08/2018       12/10/22 ([place])  TSH 1.04    DEXA 10/4/18 (Compared to 8/22/16)  T-score  L spine -0.9  Total hip -1.7  Femoral neck -2  Stable at the hip  Increased 2/7 % at the spine      [facility] Inresults - 10/13/2020  5:09 PM EDT  BONE DENSITOMETRY    Clinical Indication: Menopause.    Comparison date: 10/4/2018    Bone densitometry of the AP lumbar spine and left hip was performed using a [employer] bone densitometer. The patient is well positioned and the regions of interest are properly placed.    AP spine L1-L4       BMD:  1.093 g/cm2     T-score:  -0.8,   normal            Total hip                   BMD:  0.729 g/cm2     T-score:  -2.2,   osteopenia     Femoral neck          BMD:  0.764 g/cm2     T-score:  -2.0,   " osteopenia      There is a decrease of 8.3% in the bone density of the hip and no statistically significant change in the bone density of the lumbar spine.    A FRAX score was also calculated on this patient since the bone density falls within the osteopenic range. Risk factors which were included in this calculation are as follows:    Family history of parent with hip fracture.    The 10-year probability of a major osteoporotic fracture is 23.2%. The 10-year probability of a hip fracture is 11.3%.      Assessment/Plan    Surgical Hypothyroidism    She had total thyroidectomy for a large symptomatic goiter in 2009 by Dr. Frost. The final surgical pathology was benign.  She had temporary left vocal cord paralysis after the surgery, but she recovered.      She is on treatment with Anastrozole.   Her TSH is up to 5.9  She is going to move the magnesium and the famotidine to a different time so they don't interfere with the absorption of the thyroid medication    Get labs done again in 8 weeks       Osteopenia    She stopped smoking in 2017.  She did not tolerate Atelvia in 2017 because of significant generalized joint pain.  She did not tolerate calcium supplements because of abdominal cramps  .  - continue vitamin D3, 4000 IU daily  - continue adequate calcium intake  - regular exercise  - she was started on treatment with prolia November 2022, by her oncologist. She continues treatment with Anastrozole.     Acid Reflux    Her symptoms are controlled on treatment with Nexium.  It is not interfering with her thyroid levels.    Hypertension  - recently diagnosed  - her BP has improved on lower dose amlodipine  - she will continue low salt diet  - she will start monitoring her BP at home.    I spent 40 minutes on this date of service performing the following activities: obtaining history, performing examination, entering orders, documenting, preparing for visit, obtaining / reviewing records, providing counseling and  education, independently reviewing study/studies and coordinating care.    Patient Instructions   -Your thyroid level is not ideal.  You are likely not absorbing the entire amount of the thyroid pill    -For now continue Synthroid 75 mcg, every morning  Ideally wait 30 minutes before eating    -You need to separate the famotidine from the thyroid by at least an hour    -You need to separate the magnesium from the thyroid medication by at least 4 hours, so I recommend taking the magnesium lunch or later    -Other vitamins that would need to be  from the thyroid medication by at least 4 hours are:  Calcium pill, magnesium Pill, multivitamin, iron pill    -If you are not sure and you are starting an over-the-counter supplement or vitamin you can send me a message    -Get your blood test done again in January around 8 weeks from today  Send a portal message OR Call about the results if you don't hear in one week:  [phone]  Prompt 4  Prompt 1  And leave a message    -Talk to Dr. Egan about your symptoms of depression and anxiety  You may need treatment for the short-term until your body recovers from the concussion          Aicha Cota MD  11/15/2023

## 2024-01-16 LAB
T4 FREE SERPL-MCNC: 1.53 NG/DL (ref 0.82–1.77)
TSH SERPL DL<=0.005 MIU/L-ACNC: 1.56 UIU/ML (ref 0.45–4.5)

## 2025-01-02 ENCOUNTER — APPOINTMENT (OUTPATIENT)
Dept: LAB | Age: 80
End: 2025-01-02
Attending: INTERNAL MEDICINE
Payer: MEDICARE

## 2025-01-02 ENCOUNTER — TRANSCRIBE ORDERS (OUTPATIENT)
Dept: REGISTRATION | Age: 80
End: 2025-01-02

## 2025-01-02 DIAGNOSIS — E89.0 POSTPROCEDURAL HYPOTHYROIDISM: Primary | ICD-10-CM

## 2025-01-02 DIAGNOSIS — E55.9 VITAMIN D DEFICIENCY, UNSPECIFIED: ICD-10-CM

## 2025-01-02 DIAGNOSIS — M85.80 OTHER SPECIFIED DISORDERS OF BONE DENSITY AND STRUCTURE, UNSPECIFIED SITE: ICD-10-CM

## 2025-01-02 DIAGNOSIS — E89.0 POSTPROCEDURAL HYPOTHYROIDISM: ICD-10-CM

## 2025-01-02 LAB
25(OH)D3 SERPL-MCNC: 33 NG/ML (ref 30–100)
ALBUMIN SERPL-MCNC: 4.3 G/DL (ref 3.5–5.7)
ALP SERPL-CCNC: 55 IU/L (ref 34–125)
ALT SERPL-CCNC: 17 IU/L (ref 7–52)
ANION GAP SERPL CALC-SCNC: 7 MEQ/L (ref 3–15)
AST SERPL-CCNC: 17 IU/L (ref 13–39)
BILIRUB SERPL-MCNC: 0.5 MG/DL (ref 0.3–1.2)
BUN SERPL-MCNC: 19 MG/DL (ref 7–25)
CALCIUM SERPL-MCNC: 9.5 MG/DL (ref 8.6–10.3)
CHLORIDE SERPL-SCNC: 103 MEQ/L (ref 98–107)
CO2 SERPL-SCNC: 30 MEQ/L (ref 21–31)
CREAT SERPL-MCNC: 0.8 MG/DL (ref 0.6–1.2)
EGFRCR SERPLBLD CKD-EPI 2021: >60 ML/MIN/1.73M*2
GLUCOSE SERPL-MCNC: 92 MG/DL (ref 70–99)
POTASSIUM SERPL-SCNC: 4 MEQ/L (ref 3.5–5.1)
PROT SERPL-MCNC: 6.5 G/DL (ref 6–8.2)
SODIUM SERPL-SCNC: 140 MEQ/L (ref 136–145)
T4 FREE SERPL-MCNC: 0.89 NG/DL (ref 0.58–1.64)
TSH SERPL DL<=0.05 MIU/L-ACNC: 1.67 MIU/L (ref 0.34–5.6)

## 2025-01-02 PROCEDURE — 82306 VITAMIN D 25 HYDROXY: CPT

## 2025-01-02 PROCEDURE — 80053 COMPREHEN METABOLIC PANEL: CPT

## 2025-01-02 PROCEDURE — 84443 ASSAY THYROID STIM HORMONE: CPT

## 2025-01-02 PROCEDURE — 36415 COLL VENOUS BLD VENIPUNCTURE: CPT

## 2025-01-02 PROCEDURE — 84439 ASSAY OF FREE THYROXINE: CPT

## 2025-01-06 ENCOUNTER — TELEPHONE (OUTPATIENT)
Dept: ENDOCRINOLOGY | Facility: CLINIC | Age: 80
End: 2025-01-06

## 2025-01-06 NOTE — TELEPHONE ENCOUNTER
Patient is scheduled for an 11am appointment tomorrow and asked if it could be changed to a telemedicine visit. If the appointment type can not be changed,she asked if Dr. Cota could give her a call to discuss the recent blood work results. Best contact number [phone].

## 2025-01-27 RX ORDER — LEVOTHYROXINE SODIUM 75 UG/1
75 TABLET ORAL DAILY
Qty: 30 TABLET | Refills: 0 | Status: SHIPPED | OUTPATIENT
Start: 2025-01-27 | End: 2025-02-13 | Stop reason: SDUPTHER

## 2025-02-13 ENCOUNTER — TELEMEDICINE (OUTPATIENT)
Dept: ENDOCRINOLOGY | Facility: CLINIC | Age: 80
End: 2025-02-13
Payer: MEDICARE

## 2025-02-13 DIAGNOSIS — E55.9 VITAMIN D DEFICIENCY: ICD-10-CM

## 2025-02-13 DIAGNOSIS — E89.0 POST-SURGICAL HYPOTHYROIDISM: Primary | ICD-10-CM

## 2025-02-13 PROCEDURE — 99214 OFFICE O/P EST MOD 30 MIN: CPT | Mod: 93 | Performed by: INTERNAL MEDICINE

## 2025-02-13 RX ORDER — LEVOTHYROXINE SODIUM 75 UG/1
75 TABLET ORAL DAILY
Qty: 90 TABLET | Refills: 3 | Status: SHIPPED | OUTPATIENT
Start: 2025-02-13

## 2025-02-13 RX ORDER — EZETIMIBE 10 MG
10 TABLET ORAL DAILY
COMMUNITY
Start: 2024-06-28

## 2025-02-13 NOTE — PROGRESS NOTES
ENDOCRINOLOGY NOTE     TELEMEDICINE VISIT    Verification of Patient Location:   The patient affirms they are currently located in the following state: [place]   Request for Consent:   Audio Only Encounter   You and I are about to have a telemedicine check-in or visit. This is allowed because you have requested it. This telemedicine visit will be billed to your health insurance or you, if you are self-insured. You understand you will be responsible for any copayments or coinsurances that apply to your telemedicine visit. Before starting our telemedicine visit, I am required to get your consent for this virtual check-in or visit by telemedicine. Do you consent?   Patient Response to Request for Consent:   Yes   Time Spent:   I spent 25 minutes on this date of service performing the following activities: obtaining history, entering orders, documenting, preparing for visit, providing counseling and education, and communicating results.      Subjective    HPI:  Agnieszka Burton is a 79 y.o. female who presents for follow up for thyroid disease and osteopenia    Last visit: 11/15/2023    She takes the Synthroid (NB) 75 mcg every morning, on empty stomach. She eats one hour later.   She takes the magnesium in the afternoon.   She changed her routine after her visit with me in November 2023.    She had rotator cuff surgery in November, she is in PT.   She stopped anastrozole August 2024. She was having joint pain and difficulty walking. Her joints are better.   She has recovered well from the concussion.  (Fall in December 2022)    She is overall feeling much better.  She has a good energy level.  She tries to walk outside when she can.      Right breast lumpectomy(partial mastectomy) 11/16/20.   Left breast lumpectomy 12/22/20  Left re-excision 1/11/2021  Left breast mastectomy with reconstruction (flap) 3/30/2021  No chemo or radiation. She was started on anastrozole on 4/30/3021, she follows with oncology at  [facility]. She was started on Prolia, first dose 11/1/22.      Supplements:  - vitamin D3 4000 IU daily   - magnesium  - vitamin C  - vitamin E     Diet:  - lactaid with her coffee (2 cups of coffee per day)  - yogurt on and off  - cheese daily     Exercise:   - aiming for walking 2  miles per day, outside ([employer]) or treadmill  - gardening       Social History:    She stopped smoking completely in February of 2017.  She is .  She has one son and one daughter.  She stopped working January of 2018. She worked for a [employer] company called [employer].  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  She took a real [employer] course  She sold her house, she was staying with her daughter, she moved to a new place early November      HISTORY  VISIT: 12/14/22  She fell in [place] on 12/7/22, she tripped over the curb, she fell face forward. No dizziness or loss of consciousness. She was taken to the ER in [place], BP was up to 200 systolic. She was then admitted to [facility], she was having blurred vision. She has a concussion. No stroke.   Her BP was high in the hospital also, she was discharged on amlodipine. She never took BP meds before.   She is planning PT for the concussion.   She is having pain going down the left arm, MRI of the spine is planned.       Medications:    Current Outpatient Medications:     ascorbic acid, vitamin C, (VITAMIN C) 500 mg CR capsule, 500 mg., Disp: , Rfl:     cholecalciferol, vitamin D3, 50 mcg (2,000 unit) tablet, take 1 by oral route  every day, Disp: , Rfl:     denosumab (PROLIA SUBQ), Inject under the skin., Disp: , Rfl:     famotidine (PEPCID) 40 mg tablet, Take 40 mg by mouth 2 times daily., Disp: , Rfl:     losartan-hydrochlorothiazide (HYZAAR) 100-12.5 mg per tablet, Take 1 tablet by mouth daily., Disp: , Rfl:     rosuvastatin (CRESTOR) 20 mg tablet, 10 mg. (Patient taking differently: Take 10 mg by mouth daily.), Disp: , Rfl:     SYNTHROID 75 mcg tablet, Take 1 tablet (75  mcg total) by mouth daily., Disp: 90 tablet, Rfl: 3    vitamin E 400 unit capsule, take 2 pills daily, Disp: , Rfl:     ZETIA 10 mg tablet, Take 10 mg by mouth daily., Disp: , Rfl:     anastrozole (ARIMIDEX) 1 mg tablet, , Disp: , Rfl:     fexofenadine-pseudoephedrine (ALLEGRA-D 24) 180-240 mg per 24 hr tablet, take 1 tablet by oral route  every day on an empty stomach with a glass of water, Disp: , Rfl:     mometasone 50 mcg/actuation nasal spray, Administer 2 sprays into each nostril daily., Disp: , Rfl:      Allergies:  Iodinated contrast media, Latex, Penicillin, and Sulfa (sulfonamide antibiotics)     Medical History:  Past Medical History:   Diagnosis Date    Acid reflux     Breast cancer (CMS/HCC)     Cancer (CMS/HCC)     breast cancer, mastectomy 3/2021    Disease of thyroid gland     Hx of tonsillectomy     Lipid disorder        Surgical History:   Past Surgical History   Procedure Laterality Date    Appendectomy      Hysterectomy      Mastectomy Left     Nephropexy      Rotator cuff repair Left 2024    Thyroidectomy         Family History:  Family History   Problem Relation Name Age of Onset    Coronary artery disease Biological Mother      Osteoporosis Biological Mother      COPD Biological Father      Heart attack Biological Father         Social history:  Social History     Socioeconomic History    Marital status:      Spouse name: Not on file    Number of children: Not on file    Years of education: Not on file    Highest education level: Not on file   Occupational History    Not on file   Tobacco Use    Smoking status: Former     Current packs/day: 0.00     Average packs/day: 1 pack/day for 55.0 years (55.0 ttl pk-yrs)     Types: Cigarettes     Start date:      Quit date:      Years since quittin.1     Passive exposure: Past    Smokeless tobacco: Never    Tobacco comments:     Quit for about 25 yrs during span, max was a pack a day but tried to use less   Substance and Sexual  Activity    Alcohol use: Yes     Comment: social     Drug use: No    Sexual activity: Not on file   Other Topics Concern    Not on file   Social History Narrative    Not on file     Social Drivers of Health     Financial Resource Strain: Not on file   Food Insecurity: No Food Insecurity (9/23/2021)    Hunger Vital Sign     Worried About Running Out of Food in the Last Year: Never true     Ran Out of Food in the Last Year: Never true   Transportation Needs: Not on file   Physical Activity: Not on file   Stress: Not on file   Social Connections: Not on file   Intimate Partner Violence: Not on file   Housing Stability: Not on file       Review of Systems  All other systems reviewed and negative except as noted in HPI    Objective   There were no vitals filed for this visit.    There is no height or weight on file to calculate BMI.    Physical Exam       Lab Results   Component Value Date    TSH 1.67 01/02/2025    TSH 1.560 01/15/2024    FREET4 0.89 01/02/2025    ALT 17 01/02/2025        Lab Results   Component Value Date    HGBA1C 6.2 (H) 10/07/2024    HGBA1C 6.1 (H) 06/25/2024    CREATININE 0.8 01/02/2025    K 4.0 01/02/2025     Lab Results   Component Value Date    TSH 1.67 01/02/2025    FREET4 0.89 01/02/2025       12/10/22 ([place])  TSH 1.04    DEXA 10/4/18 (Compared to 8/22/16)  T-score  L spine -0.9  Total hip -1.7  Femoral neck -2  Stable at the hip  Increased 2/7 % at the spine      [facility]iant Inresults - 10/13/2020  5:09 PM EDT  BONE DENSITOMETRY    Clinical Indication: Menopause.    Comparison date: 10/4/2018    Bone densitometry of the AP lumbar spine and left hip was performed using a [employer] bone densitometer. The patient is well positioned and the regions of interest are properly placed.    AP spine L1-L4       BMD:  1.093 g/cm2     T-score:  -0.8,   normal            Total hip                   BMD:  0.729 g/cm2     T-score:  -2.2,   osteopenia     Femoral neck          BMD:  0.764 g/cm2     T-score:   -2.0,   osteopenia      There is a decrease of 8.3% in the bone density of the hip and no statistically significant change in the bone density of the lumbar spine.    A FRAX score was also calculated on this patient since the bone density falls within the osteopenic range. Risk factors which were included in this calculation are as follows:    Family history of parent with hip fracture.    The 10-year probability of a major osteoporotic fracture is 23.2%. The 10-year probability of a hip fracture is 11.3%.      Assessment/Plan    Surgical Hypothyroidism    She had total thyroidectomy for a large symptomatic goiter in 2009 by Dr. Frost. The final surgical pathology was benign.  She had temporary left vocal cord paralysis after the surgery, but she recovered.      Anastrozole stopped in August 2024  Thyroid levels remain normal and in good range on her current routine of taking the thyroid medication       Osteopenia    She stopped smoking in 2017.  She did not tolerate Atelvia in 2017 because of significant generalized joint pain.  She did not tolerate calcium supplements because of abdominal cramps  .  - continue vitamin D3, 4000 IU daily  - continue adequate calcium intake  - regular exercise  - she was started on treatment with prolia November 2022, by her oncologist.     Acid Reflux    Her symptoms are controlled on treatment with Nexium.  It is not interfering with her thyroid levels.        There are no Patient Instructions on file for this visit.    Aicha Cota MD  2/13/2025